# Patient Record
Sex: MALE | Race: WHITE | Employment: OTHER | URBAN - METROPOLITAN AREA
[De-identification: names, ages, dates, MRNs, and addresses within clinical notes are randomized per-mention and may not be internally consistent; named-entity substitution may affect disease eponyms.]

---

## 2017-11-22 PROBLEM — J34.89 NASAL OBSTRUCTION: Status: ACTIVE | Noted: 2017-06-27

## 2017-11-22 PROBLEM — J32.2 SINUSITIS CHRONIC, ETHMOIDAL: Status: ACTIVE | Noted: 2017-08-11

## 2017-11-22 PROBLEM — J33.9 NASAL POLYPS: Status: ACTIVE | Noted: 2017-08-11

## 2017-11-22 PROBLEM — R41.3 MEMORY LOSS: Status: ACTIVE | Noted: 2017-11-22

## 2017-11-29 ENCOUNTER — HOSPITAL ENCOUNTER (OUTPATIENT)
Dept: LAB | Age: 77
Discharge: HOME | End: 2017-11-29

## 2017-11-29 PROCEDURE — 88305 TISSUE EXAM BY PATHOLOGIST: CPT | Performed by: INTERNAL MEDICINE

## 2018-04-09 ENCOUNTER — HOSPITAL ENCOUNTER (EMERGENCY)
Age: 78
Discharge: HOME | End: 2018-04-09
Attending: EMERGENCY MEDICINE
Payer: MEDICARE

## 2018-04-09 VITALS
TEMPERATURE: 98.2 F | HEART RATE: 61 BPM | SYSTOLIC BLOOD PRESSURE: 150 MMHG | RESPIRATION RATE: 16 BRPM | DIASTOLIC BLOOD PRESSURE: 77 MMHG | BODY MASS INDEX: 27.84 KG/M2 | WEIGHT: 194 LBS | OXYGEN SATURATION: 98 %

## 2018-04-09 DIAGNOSIS — R33.9 URINARY RETENTION: Primary | ICD-10-CM

## 2018-04-09 LAB
APPEARANCE UR: CLEAR
BACTERIA URNS QL MICRO: 0 /HPF
BILIRUB UR QL: NEGATIVE
CASTS URNS QL MICRO: ABNORMAL /LPF
COLOR UR: YELLOW
EPI CELLS #/AREA URNS HPF: 0 /HPF
GLUCOSE UR STRIP.AUTO-MCNC: NEGATIVE MG/DL
HGB UR QL STRIP: ABNORMAL
KETONES UR QL STRIP.AUTO: NEGATIVE MG/DL
LEUKOCYTE ESTERASE UR QL STRIP.AUTO: NEGATIVE
NITRITE UR QL STRIP.AUTO: NEGATIVE
PH UR STRIP: 7 [PH] (ref 5–9)
PROT UR STRIP-MCNC: NEGATIVE MG/DL
RBC #/AREA URNS HPF: >100 /HPF
SP GR UR REFRACTOMETRY: 1.01 (ref 1–1.02)
UROBILINOGEN UR QL STRIP.AUTO: 0.2 EU/DL (ref 0.2–1)
WBC URNS QL MICRO: ABNORMAL /HPF

## 2018-04-09 PROCEDURE — 99284 EMERGENCY DEPT VISIT MOD MDM: CPT | Performed by: EMERGENCY MEDICINE

## 2018-04-09 PROCEDURE — 77030005518 HC CATH URETH FOL 2W BARD -B

## 2018-04-09 PROCEDURE — 81001 URINALYSIS AUTO W/SCOPE: CPT | Performed by: EMERGENCY MEDICINE

## 2018-04-09 PROCEDURE — 51702 INSERT TEMP BLADDER CATH: CPT | Performed by: EMERGENCY MEDICINE

## 2018-04-09 RX ORDER — TAMSULOSIN HYDROCHLORIDE 0.4 MG/1
0.4 CAPSULE ORAL DAILY
Qty: 10 CAP | Refills: 0 | Status: SHIPPED | OUTPATIENT
Start: 2018-04-09 | End: 2018-04-19

## 2018-04-09 NOTE — ED NOTES
I have reviewed discharge instructions with the patient and spouse. The patient and spouse verbalized understanding. Patient left ED via Discharge Method: ambulatory to Home with spouse    Opportunity for questions and clarification provided. Patient given 1 scripts. To continue your aftercare when you leave the hospital, you may receive an automated call from our care team to check in on how you are doing. This is a free service and part of our promise to provide the best care and service to meet your aftercare needs.  If you have questions, or wish to unsubscribe from this service please call [phone]. Thank you for Choosing our [facility] Emergency Department.

## 2018-04-09 NOTE — ED TRIAGE NOTES
Pt reports he was told he had a kidney stone. Pt states feeling like he is unable to pass very much urine.     Ganesh Yap RN

## 2018-04-09 NOTE — DISCHARGE INSTRUCTIONS

## 2018-04-09 NOTE — ED PROVIDER NOTES
HPI Comments: Patient presents to the ER complaining of urinary retention. Reports he recently  Passed a kidney stone. Had had some blood in his urine 3 days ago. Reports yesterday he had issues with increased urinary urgency but only has been able to get small amounts of urine out. Denies any fevers or vomiting. Reports increase in suprapubic pressure and pain. Patient is a 69 y.o. male presenting with inability to urinate. The history is provided by the patient. Urinary Retention    This is a new problem. The current episode started 12 to 24 hours ago. The problem occurs constantly. The problem has not changed since onset. The pain is located in the suprapubic region. The quality of the pain is throbbing. The pain is at a severity of 5/10. The pain is moderate. Associated symptoms include frequency. Pertinent negatives include no fever, no melena, no vomiting, no dysuria and no back pain. Past Medical History:   Diagnosis Date    Hypercholesterolemia     Hypertension        History reviewed. No pertinent surgical history. History reviewed. No pertinent family history. Social History     Social History    Marital status:      Spouse name: N/A    Number of children: N/A    Years of education: N/A     Occupational History    Not on file. Social History Main Topics    Smoking status: Never Smoker    Smokeless tobacco: Never Used    Alcohol use Yes      Comment: socially    Drug use: Not on file    Sexual activity: Not on file     Other Topics Concern    Not on file     Social History Narrative         ALLERGIES: Review of patient's allergies indicates no known allergies. Review of Systems   Constitutional: Positive for fatigue. Negative for fever. HENT: Negative for congestion and dental problem. Eyes: Negative for photophobia, redness and visual disturbance. Respiratory: Negative for chest tightness and stridor. Cardiovascular: Negative for palpitations. Gastrointestinal: Negative for abdominal pain, anal bleeding, melena and vomiting. Endocrine: Negative for polydipsia, polyphagia and polyuria. Genitourinary: Positive for frequency and urgency. Negative for dysuria. Musculoskeletal: Negative for back pain. Skin: Negative for pallor and rash. Allergic/Immunologic: Negative for food allergies and immunocompromised state. Neurological: Negative for light-headedness and numbness. Hematological: Negative for adenopathy. Does not bruise/bleed easily. Psychiatric/Behavioral: Negative for behavioral problems and confusion. All other systems reviewed and are negative. Vitals:    04/09/18 1207   BP: 122/65   Pulse: 61   Resp: 16   Temp: 98.2 °F (36.8 °C)   SpO2: 98%   Weight: 88 kg (194 lb)            Physical Exam   Constitutional: He is oriented to person, place, and time. He appears well-developed and well-nourished. HENT:   Head: Normocephalic and atraumatic. Mouth/Throat: Oropharynx is clear and moist.   Eyes: Conjunctivae and EOM are normal. Pupils are equal, round, and reactive to light. No scleral icterus. Cardiovascular: Normal rate, regular rhythm and intact distal pulses. Pulmonary/Chest: Effort normal and breath sounds normal. No respiratory distress. He has no wheezes. Abdominal: Soft. Bowel sounds are normal. There is tenderness in the suprapubic area. Musculoskeletal: Normal range of motion. He exhibits no edema or deformity. Neurological: He is alert and oriented to person, place, and time. He has normal reflexes. Nursing note and vitals reviewed. MDM  Number of Diagnoses or Management Options  Diagnosis management comments: Bedside ultrasound does reveal a distended bladder. We'll place Ballard catheter. Obtain urinalysis to rule out infection    2:27 PM  Normal urinalysis, Ballard did drain approximately 900 cc of clear yellow urine.   We will discharge with Ballard in place, encouraged close follow-up with urology       Amount and/or Complexity of Data Reviewed  Clinical lab tests: ordered and reviewed    Risk of Complications, Morbidity, and/or Mortality  Presenting problems: moderate  Diagnostic procedures: low  Management options: low          ED Course       Bedside US  Date/Time: 4/9/2018 1:46 PM  Performed by: Korin Jaramillo by: Tad Marmolejo     Written consent obtained: Yes    Given by:  Patient  Performed by: Attending  Type of procedure: Focused renal/urinary tract  Indications:  Urinary retention  Right kidney long axis (coronal): Not obtained  Right kidney short axis:  Not obtained  Left kidney long axis (coronal):   Not obtained  Left kidney short axis:  Not obtained  Transverse bladder:  Adequate  Sagittal bladder:  Adequate  Bladder volume (ml):  800  Bladder size:  Distended

## 2018-04-10 ENCOUNTER — HOSPITAL ENCOUNTER (EMERGENCY)
Age: 78
Discharge: HOME | End: 2018-04-10
Attending: EMERGENCY MEDICINE
Payer: MEDICARE

## 2018-04-10 PROCEDURE — 75810000275 HC EMERGENCY DEPT VISIT NO LEVEL OF CARE: Performed by: EMERGENCY MEDICINE

## 2018-04-13 PROBLEM — N20.0 RENAL STONE: Status: ACTIVE | Noted: 2018-04-13

## 2018-04-13 PROBLEM — R33.9 URINARY RETENTION: Status: ACTIVE | Noted: 2018-04-13

## 2018-04-16 ENCOUNTER — HOSPITAL ENCOUNTER (EMERGENCY)
Age: 78
Discharge: HOME | End: 2018-04-16
Attending: EMERGENCY MEDICINE
Payer: MEDICARE

## 2018-04-16 VITALS
DIASTOLIC BLOOD PRESSURE: 70 MMHG | TEMPERATURE: 97.5 F | SYSTOLIC BLOOD PRESSURE: 144 MMHG | RESPIRATION RATE: 16 BRPM | HEART RATE: 66 BPM | WEIGHT: 192 LBS | HEIGHT: 71 IN | OXYGEN SATURATION: 98 % | BODY MASS INDEX: 26.88 KG/M2

## 2018-04-16 DIAGNOSIS — R33.9 URINARY RETENTION: Primary | ICD-10-CM

## 2018-04-16 LAB
BACTERIA URNS QL MICRO: NORMAL /HPF
CASTS URNS QL MICRO: 0 /LPF
CRYSTALS URNS QL MICRO: 0 /LPF
EPI CELLS #/AREA URNS HPF: NORMAL /HPF
MUCOUS THREADS URNS QL MICRO: 0 /LPF
RBC #/AREA URNS HPF: NORMAL /HPF
WBC URNS QL MICRO: NORMAL /HPF

## 2018-04-16 PROCEDURE — 74011000250 HC RX REV CODE- 250: Performed by: EMERGENCY MEDICINE

## 2018-04-16 PROCEDURE — 99284 EMERGENCY DEPT VISIT MOD MDM: CPT | Performed by: EMERGENCY MEDICINE

## 2018-04-16 PROCEDURE — 77030005518 HC CATH URETH FOL 2W BARD -B

## 2018-04-16 PROCEDURE — 51702 INSERT TEMP BLADDER CATH: CPT | Performed by: EMERGENCY MEDICINE

## 2018-04-16 PROCEDURE — 81015 MICROSCOPIC EXAM OF URINE: CPT | Performed by: EMERGENCY MEDICINE

## 2018-04-16 RX ORDER — TAMSULOSIN HYDROCHLORIDE 0.4 MG/1
0.4 CAPSULE ORAL DAILY
Qty: 7 CAP | Refills: 0 | Status: SHIPPED | OUTPATIENT
Start: 2018-04-16 | End: 2018-04-23

## 2018-04-16 RX ORDER — LIDOCAINE HYDROCHLORIDE 20 MG/ML
JELLY TOPICAL
Status: COMPLETED | OUTPATIENT
Start: 2018-04-16 | End: 2018-04-16

## 2018-04-16 RX ADMIN — LIDOCAINE HYDROCHLORIDE: 20 JELLY TOPICAL at 21:03

## 2018-04-17 NOTE — DISCHARGE INSTRUCTIONS

## 2018-04-17 NOTE — ED TRIAGE NOTES
Pt had urinary catheter removed today. Cath had been in for a week. Pt said he hasn't urinated much since 3pm.  C/o bladder pain/pressure.

## 2018-04-17 NOTE — ED PROVIDER NOTES
HPI Comments: Patient is a 60-year-old male presenting with urinary retention. States about 2 weeks ago he passed a kidney stone. After that he required a Ballard catheter for retention. He had a catheter removed today states he did fine initially but developed significant difficulty urinating at approximately 4 PM.  States bladder pain and lower abdominal pressure became so severe that he came to the emergency department. Denies any recent fevers or further issues. Patient is a 69 y.o. male presenting with inability to urinate. The history is provided by the patient. No  was used. Urinary Retention    Pertinent negatives include no fever, no nausea, no vomiting, no headaches and no back pain. Past Medical History:   Diagnosis Date    Hypercholesterolemia     Hypertension     Kidney stone        History reviewed. No pertinent surgical history. History reviewed. No pertinent family history. Social History     Social History    Marital status:      Spouse name: N/A    Number of children: N/A    Years of education: N/A     Occupational History    Not on file. Social History Main Topics    Smoking status: Former Smoker     Years: 15.00    Smokeless tobacco: Never Used    Alcohol use Yes      Comment: socially    Drug use: Not on file    Sexual activity: Not on file     Other Topics Concern    Not on file     Social History Narrative         ALLERGIES: Review of patient's allergies indicates no known allergies. Review of Systems   Constitutional: Negative for fatigue and fever. HENT: Negative for congestion. Respiratory: Negative for cough and shortness of breath. Gastrointestinal: Positive for abdominal pain. Negative for nausea and vomiting. Genitourinary: Positive for difficulty urinating. Negative for flank pain. Musculoskeletal: Negative for back pain. Skin: Negative for rash. Neurological: Negative for headaches. Psychiatric/Behavioral: Negative for confusion. Vitals:    04/16/18 2056   BP: 146/67   Pulse: 62   Resp: 18   Temp: 97.5 °F (36.4 °C)   SpO2: 97%   Weight: 87.1 kg (192 lb)   Height: 5' 11\" (1.803 m)            Physical Exam   Constitutional: He is oriented to person, place, and time. He appears well-developed and well-nourished. No distress. HENT:   Head: Normocephalic and atraumatic. Eyes: Conjunctivae and EOM are normal. Pupils are equal, round, and reactive to light. Neck: Normal range of motion. Neck supple. Cardiovascular: Normal rate, regular rhythm and normal heart sounds. Pulmonary/Chest: Effort normal and breath sounds normal. No respiratory distress. He has no wheezes. He has no rales. Abdominal: Soft. He exhibits distension. There is tenderness. There is no rebound. Moderate distention and pain in the suprapubic region. No guarding. No rebound. Musculoskeletal: Normal range of motion. He exhibits no edema or tenderness. Neurological: He is alert and oriented to person, place, and time. Skin: Skin is warm and dry. No rash noted. He is not diaphoretic. Psychiatric: He has a normal mood and affect. His behavior is normal.   Vitals reviewed. MDM  Number of Diagnoses or Management Options  Urinary retention: new and does not require workup  Diagnosis management comments: Patient improved significantly with Ballard placement. Now denying pain. Unfortunately patient will go home with Ballard again a follow-up with urology. Refilled patient's Flomax. Return precautions discussed. Patient and wife express understanding. Discharged in stable condition      Flaco Chandler MD; 4/17/2018 @12:30 AM Voice dictation software was used during the making of this note. This software is not perfect and grammatical and other typographical errors may be present.   This note has not been proofread for errors.  =================================================================== Amount and/or Complexity of Data Reviewed  Review and summarize past medical records: yes  Independent visualization of images, tracings, or specimens: yes    Risk of Complications, Morbidity, and/or Mortality  Diagnostic procedures: low  Management options: low    Patient Progress  Patient progress: improved        ED Course       Procedures

## 2018-04-17 NOTE — ED NOTES
I have reviewed discharge instructions with the patient. The patient verbalized understanding. Patient left ED via Discharge Method: ambulatory to Home with spouse. Opportunity for questions and clarification provided. Patient given 1 scripts. To continue your aftercare when you leave the hospital, you may receive an automated call from our care team to check in on how you are doing. This is a free service and part of our promise to provide the best care and service to meet your aftercare needs.  If you have questions, or wish to unsubscribe from this service please call [phone]. Thank you for Choosing our [facility].

## 2020-10-28 PROBLEM — R33.9 URINARY RETENTION: Status: RESOLVED | Noted: 2018-04-13 | Resolved: 2020-10-28

## 2020-10-28 PROBLEM — R73.01 IMPAIRED FASTING BLOOD SUGAR: Status: ACTIVE | Noted: 2020-10-28

## 2021-11-03 ENCOUNTER — OFFICE VISIT (OUTPATIENT)
Dept: CARDIOLOGY | Facility: CLINIC | Age: 81
End: 2021-11-03
Payer: MEDICARE

## 2021-11-03 VITALS
HEART RATE: 58 BPM | BODY MASS INDEX: 27.75 KG/M2 | WEIGHT: 193.81 LBS | HEIGHT: 70 IN | DIASTOLIC BLOOD PRESSURE: 68 MMHG | SYSTOLIC BLOOD PRESSURE: 140 MMHG

## 2021-11-03 DIAGNOSIS — R93.1 ABNORMAL SCREENING CARDIAC CT: ICD-10-CM

## 2021-11-03 DIAGNOSIS — R07.89 ATYPICAL CHEST PAIN: ICD-10-CM

## 2021-11-03 DIAGNOSIS — E78.2 MIXED HYPERLIPIDEMIA: ICD-10-CM

## 2021-11-03 DIAGNOSIS — Z01.30 ENCOUNTER FOR EXAMINATION OF BLOOD PRESSURE WITHOUT ABNORMAL FINDINGS: ICD-10-CM

## 2021-11-03 DIAGNOSIS — I10 ESSENTIAL HYPERTENSION: Primary | ICD-10-CM

## 2021-11-03 DIAGNOSIS — I77.811 AORTIC ECTASIA, ABDOMINAL: ICD-10-CM

## 2021-11-03 PROCEDURE — 99999 PR PBB SHADOW E&M-EST. PATIENT-LVL III: ICD-10-PCS | Mod: PBBFAC,,, | Performed by: INTERNAL MEDICINE

## 2021-11-03 PROCEDURE — 99999 PR PBB SHADOW E&M-EST. PATIENT-LVL III: CPT | Mod: PBBFAC,,, | Performed by: INTERNAL MEDICINE

## 2021-11-03 PROCEDURE — 93010 EKG 12-LEAD: ICD-10-PCS | Mod: ,,, | Performed by: INTERNAL MEDICINE

## 2021-11-03 PROCEDURE — 93010 ELECTROCARDIOGRAM REPORT: CPT | Mod: ,,, | Performed by: INTERNAL MEDICINE

## 2021-11-03 PROCEDURE — 99204 PR OFFICE/OUTPT VISIT, NEW, LEVL IV, 45-59 MIN: ICD-10-PCS | Mod: S$PBB,,, | Performed by: INTERNAL MEDICINE

## 2021-11-03 PROCEDURE — 93005 ELECTROCARDIOGRAM TRACING: CPT | Mod: PO

## 2021-11-03 PROCEDURE — 99204 OFFICE O/P NEW MOD 45 MIN: CPT | Mod: S$PBB,,, | Performed by: INTERNAL MEDICINE

## 2021-11-03 PROCEDURE — 99213 OFFICE O/P EST LOW 20 MIN: CPT | Mod: PBBFAC,PO | Performed by: INTERNAL MEDICINE

## 2021-11-03 RX ORDER — ATORVASTATIN CALCIUM 40 MG/1
40 TABLET, FILM COATED ORAL NIGHTLY
Qty: 90 TABLET | Refills: 3 | Status: SHIPPED | OUTPATIENT
Start: 2021-11-03 | End: 2022-10-19 | Stop reason: SDUPTHER

## 2021-11-03 RX ORDER — LANOLIN ALCOHOL/MO/W.PET/CERES
100 CREAM (GRAM) TOPICAL DAILY
COMMUNITY

## 2021-11-03 RX ORDER — CHOLECALCIFEROL (VITAMIN D3) 25 MCG
1000 TABLET ORAL DAILY
COMMUNITY

## 2021-11-03 RX ORDER — PYRIDOXINE HCL (VITAMIN B6) 100 MG
50 TABLET ORAL DAILY
COMMUNITY

## 2021-11-16 ENCOUNTER — HOSPITAL ENCOUNTER (OUTPATIENT)
Dept: RADIOLOGY | Facility: HOSPITAL | Age: 81
Discharge: HOME | End: 2021-11-16
Attending: INTERNAL MEDICINE
Payer: MEDICARE

## 2021-11-16 ENCOUNTER — CLINICAL SUPPORT (OUTPATIENT)
Dept: CARDIOLOGY | Facility: HOSPITAL | Age: 81
End: 2021-11-16
Attending: INTERNAL MEDICINE
Payer: MEDICARE

## 2021-11-16 VITALS — BODY MASS INDEX: 27.63 KG/M2 | HEIGHT: 70 IN | WEIGHT: 193 LBS

## 2021-11-16 VITALS — WEIGHT: 193 LBS | HEIGHT: 70 IN | BODY MASS INDEX: 27.63 KG/M2

## 2021-11-16 DIAGNOSIS — Z01.30 ENCOUNTER FOR EXAMINATION OF BLOOD PRESSURE WITHOUT ABNORMAL FINDINGS: ICD-10-CM

## 2021-11-16 DIAGNOSIS — R07.89 ATYPICAL CHEST PAIN: ICD-10-CM

## 2021-11-16 PROCEDURE — 93018 PR CARDIAC STRESS TST,INTERP/REPT ONLY: ICD-10-PCS | Mod: ,,, | Performed by: INTERNAL MEDICINE

## 2021-11-16 PROCEDURE — 93306 ECHO (CUPID ONLY): ICD-10-PCS | Mod: 26,,, | Performed by: INTERNAL MEDICINE

## 2021-11-16 PROCEDURE — 93016 CV STRESS TEST SUPVJ ONLY: CPT | Mod: ,,, | Performed by: INTERNAL MEDICINE

## 2021-11-16 PROCEDURE — 93018 CV STRESS TEST I&R ONLY: CPT | Mod: ,,, | Performed by: INTERNAL MEDICINE

## 2021-11-16 PROCEDURE — 93016 STRESS TEST WITH MYOCARDIAL PERFUSION (CUPID ONLY): ICD-10-PCS | Mod: ,,, | Performed by: INTERNAL MEDICINE

## 2021-11-16 PROCEDURE — A9502 TC99M TETROFOSMIN: HCPCS | Mod: PO

## 2021-11-16 PROCEDURE — 93017 CV STRESS TEST TRACING ONLY: CPT | Mod: PO

## 2021-11-16 PROCEDURE — 78452 HT MUSCLE IMAGE SPECT MULT: CPT | Mod: 26,,, | Performed by: INTERNAL MEDICINE

## 2021-11-16 PROCEDURE — 63600175 PHARM REV CODE 636 W HCPCS: Mod: PO | Performed by: INTERNAL MEDICINE

## 2021-11-16 PROCEDURE — 93306 TTE W/DOPPLER COMPLETE: CPT | Mod: 26,,, | Performed by: INTERNAL MEDICINE

## 2021-11-16 PROCEDURE — 93306 TTE W/DOPPLER COMPLETE: CPT | Mod: PO

## 2021-11-16 PROCEDURE — 78452 STRESS TEST WITH MYOCARDIAL PERFUSION (CUPID ONLY): ICD-10-PCS | Mod: 26,,, | Performed by: INTERNAL MEDICINE

## 2021-11-16 PROCEDURE — 78452 HT MUSCLE IMAGE SPECT MULT: CPT | Mod: PO

## 2021-11-16 RX ORDER — REGADENOSON 0.08 MG/ML
0.4 INJECTION, SOLUTION INTRAVENOUS ONCE
Status: COMPLETED | OUTPATIENT
Start: 2021-11-16 | End: 2021-11-16

## 2021-11-16 RX ADMIN — REGADENOSON 0.4 MG: 0.08 INJECTION, SOLUTION INTRAVENOUS at 09:11

## 2021-11-17 LAB
ASCENDING AORTA: 3.08 CM
AV INDEX (PROSTH): 0.85
AV MEAN GRADIENT: 5 MMHG
AV PEAK GRADIENT: 8 MMHG
AV VALVE AREA: 3.04 CM2
AV VELOCITY RATIO: 0.89
BSA FOR ECHO PROCEDURE: 2.08 M2
CV ECHO LV RWT: 0.5 CM
CV PHARM DOSE: 0.4 MG
CV STRESS BASE HR: 53 BPM
DIASTOLIC BLOOD PRESSURE: 57 MMHG
DOP CALC AO PEAK VEL: 1.41 M/S
DOP CALC AO VTI: 34.49 CM
DOP CALC LVOT AREA: 3.6 CM2
DOP CALC LVOT DIAMETER: 2.13 CM
DOP CALC LVOT PEAK VEL: 1.26 M/S
DOP CALC LVOT STROKE VOLUME: 104.71 CM3
DOP CALCLVOT PEAK VEL VTI: 29.4 CM
E WAVE DECELERATION TIME: 164.47 MSEC
E/A RATIO: 0.8
E/E' RATIO: 10.27 M/S
ECHO LV POSTERIOR WALL: 1.01 CM (ref 0.6–1.1)
EJECTION FRACTION: 65 %
FRACTIONAL SHORTENING: 33 % (ref 28–44)
INTERVENTRICULAR SEPTUM: 0.97 CM (ref 0.6–1.1)
LA MAJOR: 5.61 CM
LA MINOR: 4.92 CM
LA WIDTH: 3.87 CM
LEFT ATRIUM SIZE: 3.98 CM
LEFT ATRIUM VOLUME INDEX: 33.3 ML/M2
LEFT ATRIUM VOLUME: 68.63 CM3
LEFT INTERNAL DIMENSION IN SYSTOLE: 2.7 CM (ref 2.1–4)
LEFT VENTRICLE DIASTOLIC VOLUME INDEX: 34.69 ML/M2
LEFT VENTRICLE DIASTOLIC VOLUME: 71.46 ML
LEFT VENTRICLE MASS INDEX: 62 G/M2
LEFT VENTRICLE SYSTOLIC VOLUME INDEX: 13.2 ML/M2
LEFT VENTRICLE SYSTOLIC VOLUME: 27.1 ML
LEFT VENTRICULAR INTERNAL DIMENSION IN DIASTOLE: 4.03 CM (ref 3.5–6)
LEFT VENTRICULAR MASS: 126.76 G
LV LATERAL E/E' RATIO: 8.56 M/S
LV SEPTAL E/E' RATIO: 12.83 M/S
MV A" WAVE DURATION": 9.71 MSEC
MV PEAK A VEL: 0.96 M/S
MV PEAK E VEL: 0.77 M/S
MV STENOSIS PRESSURE HALF TIME: 47.7 MS
MV VALVE AREA P 1/2 METHOD: 4.61 CM2
NUC REST EJECTION FRACTION: 66
OHS CV CPX 1 MINUTE RECOVERY HEART RATE: 82 BPM
OHS CV CPX 85 PERCENT MAX PREDICTED HEART RATE MALE: 119
OHS CV CPX MAX PREDICTED HEART RATE: 140
OHS CV CPX PATIENT IS FEMALE: 0
OHS CV CPX PATIENT IS MALE: 1
OHS CV CPX PEAK DIASTOLIC BLOOD PRESSURE: 47 MMHG
OHS CV CPX PEAK HEAR RATE: 84 BPM
OHS CV CPX PEAK RATE PRESSURE PRODUCT: NORMAL
OHS CV CPX PEAK SYSTOLIC BLOOD PRESSURE: 122 MMHG
OHS CV CPX PERCENT MAX PREDICTED HEART RATE ACHIEVED: 60
OHS CV CPX RATE PRESSURE PRODUCT PRESENTING: 6360
OHS CV PHARM TIME: 938 MIN
PISA TR MAX VEL: 2.42 M/S
PULM VEIN S/D RATIO: 1.62
PV PEAK D VEL: 0.29 M/S
PV PEAK S VEL: 0.47 M/S
RA MAJOR: 5.39 CM
RA PRESSURE: 3 MMHG
RA WIDTH: 3.16 CM
RIGHT VENTRICULAR END-DIASTOLIC DIMENSION: 3.28 CM
RV TISSUE DOPPLER FREE WALL SYSTOLIC VELOCITY 1 (APICAL 4 CHAMBER VIEW): 13.65 CM/S
SINUS: 3.02 CM
STJ: 3.09 CM
SYSTOLIC BLOOD PRESSURE: 120 MMHG
TDI LATERAL: 0.09 M/S
TDI SEPTAL: 0.06 M/S
TDI: 0.08 M/S
TR MAX PG: 23 MMHG
TRICUSPID ANNULAR PLANE SYSTOLIC EXCURSION: 2.7 CM
TV REST PULMONARY ARTERY PRESSURE: 26 MMHG

## 2021-12-06 ENCOUNTER — OFFICE VISIT (OUTPATIENT)
Dept: CARDIOLOGY | Facility: CLINIC | Age: 81
End: 2021-12-06
Payer: MEDICARE

## 2021-12-06 VITALS
DIASTOLIC BLOOD PRESSURE: 73 MMHG | HEIGHT: 70 IN | WEIGHT: 194.69 LBS | SYSTOLIC BLOOD PRESSURE: 145 MMHG | HEART RATE: 55 BPM | OXYGEN SATURATION: 98 % | BODY MASS INDEX: 27.87 KG/M2

## 2021-12-06 DIAGNOSIS — I77.811 AORTIC ECTASIA, ABDOMINAL: ICD-10-CM

## 2021-12-06 DIAGNOSIS — E78.2 MIXED HYPERLIPIDEMIA: ICD-10-CM

## 2021-12-06 DIAGNOSIS — R93.1 ABNORMAL SCREENING CARDIAC CT: ICD-10-CM

## 2021-12-06 DIAGNOSIS — R94.39 POSITIVE CARDIAC STRESS TEST: Primary | ICD-10-CM

## 2021-12-06 DIAGNOSIS — I10 ESSENTIAL HYPERTENSION: ICD-10-CM

## 2021-12-06 PROCEDURE — 99999 PR PBB SHADOW E&M-EST. PATIENT-LVL IV: ICD-10-PCS | Mod: PBBFAC,,, | Performed by: INTERNAL MEDICINE

## 2021-12-06 PROCEDURE — 99215 PR OFFICE/OUTPT VISIT, EST, LEVL V, 40-54 MIN: ICD-10-PCS | Mod: S$PBB,,, | Performed by: INTERNAL MEDICINE

## 2021-12-06 PROCEDURE — 99999 PR PBB SHADOW E&M-EST. PATIENT-LVL IV: CPT | Mod: PBBFAC,,, | Performed by: INTERNAL MEDICINE

## 2021-12-06 PROCEDURE — 99215 OFFICE O/P EST HI 40 MIN: CPT | Mod: S$PBB,,, | Performed by: INTERNAL MEDICINE

## 2021-12-06 PROCEDURE — 99214 OFFICE O/P EST MOD 30 MIN: CPT | Mod: PBBFAC,PO | Performed by: INTERNAL MEDICINE

## 2021-12-06 RX ORDER — ISOSORBIDE MONONITRATE 30 MG/1
30 TABLET, EXTENDED RELEASE ORAL DAILY
Qty: 30 TABLET | Refills: 11 | Status: SHIPPED | OUTPATIENT
Start: 2021-12-06 | End: 2021-12-07 | Stop reason: SDUPTHER

## 2021-12-06 RX ORDER — ZINC GLUCONATE 50 MG
50 TABLET ORAL DAILY
COMMUNITY
End: 2022-06-24 | Stop reason: CLARIF

## 2021-12-06 RX ORDER — ISOSORBIDE MONONITRATE 30 MG/1
30 TABLET, EXTENDED RELEASE ORAL DAILY
Qty: 30 TABLET | Refills: 0 | OUTPATIENT
Start: 2021-12-06 | End: 2022-12-06

## 2021-12-07 DIAGNOSIS — I10 ESSENTIAL HYPERTENSION: Primary | ICD-10-CM

## 2021-12-07 RX ORDER — ISOSORBIDE MONONITRATE 30 MG/1
30 TABLET, EXTENDED RELEASE ORAL DAILY
Qty: 30 TABLET | Refills: 0 | Status: SHIPPED | OUTPATIENT
Start: 2021-12-07 | End: 2022-02-14 | Stop reason: SDUPTHER

## 2021-12-07 RX ORDER — ISOSORBIDE MONONITRATE 30 MG/1
30 TABLET, EXTENDED RELEASE ORAL DAILY
Qty: 90 TABLET | Refills: 3 | Status: SHIPPED | OUTPATIENT
Start: 2021-12-07 | End: 2022-02-14

## 2021-12-16 ENCOUNTER — PATIENT MESSAGE (OUTPATIENT)
Dept: CARDIOLOGY | Facility: CLINIC | Age: 81
End: 2021-12-16
Payer: MEDICARE

## 2022-05-04 ENCOUNTER — PATIENT MESSAGE (OUTPATIENT)
Dept: CARDIOLOGY | Facility: CLINIC | Age: 82
End: 2022-05-04
Payer: MEDICARE

## 2022-05-04 NOTE — PROGRESS NOTES
"Subjective:    Patient ID:  Andres Germain Jr. is a 81 y.o. male who presents for follow-up of No chief complaint on file.      Problem List Items Addressed This Visit        Cardiac/Vascular    Elevated Cardiac CT Calcium Score    Overview     August 2021  1154           Essential hypertension - Primary    Family H/O Early CAD    Mild Abdominal Aortic Ectasia    Mixed hyperlipidemia          HPI    Patient was last seen on 12/06/2021 at which time patient was evaluated for positive stress test.  Left heart catheterization with coronary angiography was recommended, but patient declined secondary to bad experiences with angiograms in his family in the past.  Imdur was started.    On assessment today, the patient states that he feels OK today    No chest pain.  No shortness of breath.  Working in the yard for 2-3 hours without major issues    Symptoms better on Imdur - Possibly    Review of Systems   Constitutional: Negative for decreased appetite, fever and malaise/fatigue.   Eyes: Negative for blurred vision.   Cardiovascular: Negative for chest pain, dyspnea on exertion, irregular heartbeat and leg swelling.   Respiratory: Negative for cough, hemoptysis, shortness of breath and wheezing.    Endocrine: Negative for cold intolerance and heat intolerance.   Hematologic/Lymphatic: Negative for bleeding problem.   Musculoskeletal: Negative for muscle weakness and myalgias.   Gastrointestinal: Negative for abdominal pain, constipation and diarrhea.   Genitourinary: Negative for bladder incontinence.   Neurological: Negative for dizziness and weakness.   Psychiatric/Behavioral: Negative for depression.        Objective:     Vitals:    05/05/22 1137   BP: (!) 102/57   BP Location: Left arm   Patient Position: Sitting   BP Method: Large (Automatic)   Pulse: 61   Weight: 87.5 kg (192 lb 14.4 oz)   Height: 5' 10" (1.778 m)        Physical Exam  Constitutional:       Appearance: He is well-developed.   HENT:      Head: " Normocephalic and atraumatic.   Neck:      Vascular: No JVD.   Cardiovascular:      Rate and Rhythm: Normal rate and regular rhythm.      Heart sounds: Normal heart sounds. No murmur heard.    No friction rub. No gallop.   Pulmonary:      Effort: Pulmonary effort is normal. No respiratory distress.      Breath sounds: Normal breath sounds. No wheezing or rales.   Abdominal:      General: Bowel sounds are normal.      Palpations: Abdomen is soft.      Tenderness: There is no abdominal tenderness. There is no guarding or rebound.   Musculoskeletal:      Cervical back: Normal range of motion and neck supple.   Skin:     General: Skin is warm and dry.   Neurological:      Mental Status: He is alert and oriented to person, place, and time.   Psychiatric:         Behavior: Behavior normal.             Current Outpatient Medications on File Prior to Visit   Medication Sig    aspirin (ECOTRIN) 81 MG EC tablet Take 1 tablet (81 mg total) by mouth once daily.    atorvastatin (LIPITOR) 40 MG tablet Take 1 tablet (40 mg total) by mouth every evening.    cyanocobalamin (VITAMIN B-12) 1000 MCG tablet Take 100 mcg by mouth once daily.    finasteride (PROSCAR) 5 mg tablet Take 1 tablet (5 mg total) by mouth once daily.    flaxseed oil 1,000 mg Cap Take by mouth.    isosorbide mononitrate (IMDUR) 30 MG 24 hr tablet Take 1 tablet (30 mg total) by mouth once daily.    losartan-hydrochlorothiazide 100-25 mg (HYZAAR) 100-25 mg per tablet Take 1 tablet by mouth every morning.    montelukast (SINGULAIR) 10 mg tablet Take 1 tablet (10 mg total) by mouth every evening.    multivitamin with minerals tablet Take 1 tablet by mouth once daily.    omega-3 fatty acids/fish oil (FISH OIL-OMEGA-3 FATTY ACIDS) 300-1,000 mg capsule Take 1 capsule by mouth once daily.    pyridoxine, vitamin B6, (B-6) 100 MG Tab Take 50 mg by mouth once daily.    tamsulosin (FLOMAX) 0.4 mg Cap Take 1 capsule (0.4 mg total) by mouth once daily.    vitamin D  (VITAMIN D3) 1000 units Tab Take 1,000 Units by mouth once daily.    zinc gluconate 50 mg tablet Take 50 mg by mouth once daily.     No current facility-administered medications on file prior to visit.       Lipid Panel:   Lab Results   Component Value Date    CHOL 172 10/14/2021    HDL 54 10/14/2021    LDLCALC 98.0 10/14/2021    TRIG 100 10/14/2021    CHOLHDL 31.4 10/14/2021       The ASCVD Risk score (Joseinderjit YE Jr., et al., 2013) failed to calculate for the following reasons:    The 2013 ASCVD risk score is only valid for ages 40 to 79    All pertinent labs, imaging, and EKGs reviewed.  Patient's most recent EKG tracing was personally interpreted by this provider.    Assessment:       1. Essential hypertension    2. Mixed hyperlipidemia    3. Family H/O Early CAD    4. Elevated Cardiac CT Calcium Score    5. Mild Abdominal Aortic Ectasia         Plan:     Symptoms of occasional fatigue with low BP when dehydrated  BP/Pulse OK today  Most recent echocardiogram reviewed personally     Recommend hydration especially when working outside  Cardiac PET Stress secondary to nuclear stress concerns to help target possible angiogram  Continue Imdur 30 mg PO Daily  Repeat AAA study and 1.5 years  Continue aspirin 81 mg PO Daily  Continue atorvastatin 40 mg PO Daily  Discussed [facility] with patient, not interested at this time, understands risk    Continue other cardiac medications  Mediterranean Diet/Cardiovascular Exercise Program    Patient queried and all questions were answered.    F/u in 4-6 months to reassess      Signed:    Gigi Johnson MD  5/5/2022 1:28 PM

## 2022-05-05 ENCOUNTER — OFFICE VISIT (OUTPATIENT)
Dept: CARDIOLOGY | Facility: CLINIC | Age: 82
End: 2022-05-05
Payer: MEDICARE

## 2022-05-05 VITALS
HEART RATE: 61 BPM | SYSTOLIC BLOOD PRESSURE: 102 MMHG | DIASTOLIC BLOOD PRESSURE: 57 MMHG | WEIGHT: 192.88 LBS | BODY MASS INDEX: 27.61 KG/M2 | HEIGHT: 70 IN

## 2022-05-05 DIAGNOSIS — I10 ESSENTIAL HYPERTENSION: Primary | ICD-10-CM

## 2022-05-05 DIAGNOSIS — I77.811 AORTIC ECTASIA, ABDOMINAL: ICD-10-CM

## 2022-05-05 DIAGNOSIS — R93.1 ABNORMAL FINDINGS ON DIAGNOSTIC IMAGING OF HEART AND CORONARY CIRCULATION: ICD-10-CM

## 2022-05-05 DIAGNOSIS — Z82.49 FAMILY HISTORY OF EARLY CAD: ICD-10-CM

## 2022-05-05 DIAGNOSIS — E78.2 MIXED HYPERLIPIDEMIA: ICD-10-CM

## 2022-05-05 DIAGNOSIS — R93.1 ABNORMAL SCREENING CARDIAC CT: ICD-10-CM

## 2022-05-05 DIAGNOSIS — R94.39 POSITIVE CARDIAC STRESS TEST: ICD-10-CM

## 2022-05-05 PROCEDURE — 99214 OFFICE O/P EST MOD 30 MIN: CPT | Mod: S$PBB,,, | Performed by: INTERNAL MEDICINE

## 2022-05-05 PROCEDURE — 99213 OFFICE O/P EST LOW 20 MIN: CPT | Mod: PBBFAC,PO | Performed by: INTERNAL MEDICINE

## 2022-05-05 PROCEDURE — 99999 PR PBB SHADOW E&M-EST. PATIENT-LVL III: CPT | Mod: PBBFAC,,, | Performed by: INTERNAL MEDICINE

## 2022-05-05 PROCEDURE — 99999 PR PBB SHADOW E&M-EST. PATIENT-LVL III: ICD-10-PCS | Mod: PBBFAC,,, | Performed by: INTERNAL MEDICINE

## 2022-05-05 PROCEDURE — 99214 PR OFFICE/OUTPT VISIT, EST, LEVL IV, 30-39 MIN: ICD-10-PCS | Mod: S$PBB,,, | Performed by: INTERNAL MEDICINE

## 2022-05-20 ENCOUNTER — TELEPHONE (OUTPATIENT)
Dept: CARDIOLOGY | Facility: HOSPITAL | Age: 82
End: 2022-05-20
Payer: MEDICARE

## 2022-05-24 ENCOUNTER — HOSPITAL ENCOUNTER (OUTPATIENT)
Dept: CARDIOLOGY | Facility: HOSPITAL | Age: 82
Discharge: HOME | End: 2022-05-24
Attending: INTERNAL MEDICINE
Payer: MEDICARE

## 2022-05-24 VITALS
HEIGHT: 70 IN | RESPIRATION RATE: 20 BRPM | DIASTOLIC BLOOD PRESSURE: 65 MMHG | HEART RATE: 63 BPM | WEIGHT: 192 LBS | BODY MASS INDEX: 27.49 KG/M2 | SYSTOLIC BLOOD PRESSURE: 135 MMHG

## 2022-05-24 DIAGNOSIS — R94.39 POSITIVE CARDIAC STRESS TEST: ICD-10-CM

## 2022-05-24 DIAGNOSIS — R93.1 ABNORMAL FINDINGS ON DIAGNOSTIC IMAGING OF HEART AND CORONARY CIRCULATION: ICD-10-CM

## 2022-05-24 LAB
CFR FLOW - ANTERIOR: 1.92
CFR FLOW - INFERIOR: 1.89
CFR FLOW - LATERAL: 1.7
CFR FLOW - MAX: 2.29
CFR FLOW - MIN: 0.99
CFR FLOW - SEPTAL: 2.04
CFR FLOW - WHOLE HEART: 1.89
CV STRESS BASE HR: 54 BPM
DIASTOLIC BLOOD PRESSURE: 64 MMHG
EJECTION FRACTION- HIGH: 65 %
END DIASTOLIC INDEX-HIGH: 153 ML/M2
END DIASTOLIC INDEX-LOW: 93 ML/M2
END SYSTOLIC INDEX-HIGH: 71 ML/M2
END SYSTOLIC INDEX-LOW: 31 ML/M2
NUC REST DIASTOLIC VOLUME INDEX: 82
NUC REST EJECTION FRACTION: 71
NUC REST SYSTOLIC VOLUME INDEX: 24
NUC STRESS DIASTOLIC VOLUME INDEX: 70
NUC STRESS EJECTION FRACTION: 51 %
NUC STRESS SYSTOLIC VOLUME INDEX: 34
OHS CV CPX 1 MINUTE RECOVERY HEART RATE: 81 BPM
OHS CV CPX 85 PERCENT MAX PREDICTED HEART RATE MALE: 119
OHS CV CPX MAX PREDICTED HEART RATE: 140
OHS CV CPX PATIENT IS FEMALE: 0
OHS CV CPX PATIENT IS MALE: 1
OHS CV CPX PEAK DIASTOLIC BLOOD PRESSURE: 38 MMHG
OHS CV CPX PEAK HEAR RATE: 56 BPM
OHS CV CPX PEAK RATE PRESSURE PRODUCT: 6384
OHS CV CPX PEAK SYSTOLIC BLOOD PRESSURE: 114 MMHG
OHS CV CPX PERCENT MAX PREDICTED HEART RATE ACHIEVED: 40
OHS CV CPX RATE PRESSURE PRODUCT PRESENTING: 8856
PERFUSION DEFECT 1 SIZE IN %: 2 %
PERFUSION DEFECT SIZE WORSENS % 1: 10 %
REST FLOW - ANTERIOR: 0.52 CC/MIN/G
REST FLOW - INFERIOR: 0.56 CC/MIN/G
REST FLOW - LATERAL: 0.45 CC/MIN/G
REST FLOW - MAX: 0.74 CC/MIN/G
REST FLOW - MIN: 0.29 CC/MIN/G
REST FLOW - SEPTAL: 0.53 CC/MIN/G
REST FLOW - WHOLE HEART: 0.52 CC/MIN/G
RETIRED EF AND QEF - SEE NOTES: 53 %
STRESS FLOW - ANTERIOR: 0.99 CC/MIN/G
STRESS FLOW - INFERIOR: 1.07 CC/MIN/G
STRESS FLOW - LATERAL: 0.77 CC/MIN/G
STRESS FLOW - MAX: 1.67 CC/MIN/G
STRESS FLOW - MIN: 0.32 CC/MIN/G
STRESS FLOW - SEPTAL: 1.09 CC/MIN/G
STRESS FLOW - WHOLE HEART: 0.98 CC/MIN/G
SYSTOLIC BLOOD PRESSURE: 164 MMHG

## 2022-05-24 PROCEDURE — 78434 PR PET, ABS QUANT MYOCARD BLOOD FLOW, REST/STRESS: ICD-10-PCS | Mod: 26,,, | Performed by: INTERNAL MEDICINE

## 2022-05-24 PROCEDURE — 78431 MYOCRD IMG PET RST&STRS CT: CPT

## 2022-05-24 PROCEDURE — 93016 CV STRESS TEST SUPVJ ONLY: CPT | Mod: ,,, | Performed by: INTERNAL MEDICINE

## 2022-05-24 PROCEDURE — 78434 AQMBF PET REST & RX STRESS: CPT | Mod: 26,,, | Performed by: INTERNAL MEDICINE

## 2022-05-24 PROCEDURE — 78434 AQMBF PET REST & RX STRESS: CPT

## 2022-05-24 PROCEDURE — 78431 CARDIAC PET SCAN STRESS (CUPID ONLY): ICD-10-PCS | Mod: 26,,, | Performed by: INTERNAL MEDICINE

## 2022-05-24 PROCEDURE — 93016 CARDIAC PET SCAN STRESS (CUPID ONLY): ICD-10-PCS | Mod: ,,, | Performed by: INTERNAL MEDICINE

## 2022-05-24 PROCEDURE — 63600175 PHARM REV CODE 636 W HCPCS: Performed by: INTERNAL MEDICINE

## 2022-05-24 PROCEDURE — 78431 MYOCRD IMG PET RST&STRS CT: CPT | Mod: 26,,, | Performed by: INTERNAL MEDICINE

## 2022-05-24 PROCEDURE — 93018 CV STRESS TEST I&R ONLY: CPT | Mod: ,,, | Performed by: INTERNAL MEDICINE

## 2022-05-24 PROCEDURE — 93018 CARDIAC PET SCAN STRESS (CUPID ONLY): ICD-10-PCS | Mod: ,,, | Performed by: INTERNAL MEDICINE

## 2022-05-24 RX ORDER — REGADENOSON 0.08 MG/ML
0.4 INJECTION, SOLUTION INTRAVENOUS ONCE
Status: COMPLETED | OUTPATIENT
Start: 2022-05-24 | End: 2022-05-24

## 2022-05-24 RX ORDER — AMINOPHYLLINE 25 MG/ML
75 INJECTION, SOLUTION INTRAVENOUS ONCE
Status: COMPLETED | OUTPATIENT
Start: 2022-05-24 | End: 2022-05-24

## 2022-05-24 RX ADMIN — REGADENOSON 0.4 MG: 0.08 INJECTION, SOLUTION INTRAVENOUS at 11:05

## 2022-05-24 RX ADMIN — AMINOPHYLLINE 75 MG: 25 INJECTION, SOLUTION INTRAVENOUS at 11:05

## 2022-05-26 ENCOUNTER — TELEPHONE (OUTPATIENT)
Dept: CARDIOLOGY | Facility: CLINIC | Age: 82
End: 2022-05-26
Payer: MEDICARE

## 2022-05-26 NOTE — TELEPHONE ENCOUNTER
----- Message from Ashlee Harris sent at 5/26/2022  2:12 PM CDT -----  Contact: patient  Type:  Patient Returning Call    Who Called:  patient  Who Left Message for Patient:  Roberta  Does the patient know what this is regarding?:  appt  Best Call Back Number:  [phone] (home)   Additional Information:  patient has another question he forgot to ask.

## 2022-05-26 NOTE — TELEPHONE ENCOUNTER
----- Message from Olinda Stanford sent at 5/26/2022  1:47 PM CDT -----  Patient Call Back    Who Called: PT     What is the request in detail: pt returning a missed call regarding his results. Pls advise.    Can the clinic reply by [facility]?    Best Call Back Number: [phone] (H)

## 2022-05-30 ENCOUNTER — OFFICE VISIT (OUTPATIENT)
Dept: CARDIOLOGY | Facility: CLINIC | Age: 82
End: 2022-05-30
Payer: MEDICARE

## 2022-05-30 VITALS
BODY MASS INDEX: 27.87 KG/M2 | HEART RATE: 60 BPM | HEIGHT: 70 IN | WEIGHT: 194.69 LBS | DIASTOLIC BLOOD PRESSURE: 57 MMHG | SYSTOLIC BLOOD PRESSURE: 100 MMHG

## 2022-05-30 DIAGNOSIS — R93.1 ABNORMAL SCREENING CARDIAC CT: ICD-10-CM

## 2022-05-30 DIAGNOSIS — I10 ESSENTIAL HYPERTENSION: Primary | ICD-10-CM

## 2022-05-30 DIAGNOSIS — I77.811 AORTIC ECTASIA, ABDOMINAL: ICD-10-CM

## 2022-05-30 DIAGNOSIS — Z82.49 FAMILY HISTORY OF EARLY CAD: ICD-10-CM

## 2022-05-30 DIAGNOSIS — R94.39 POSITIVE CARDIAC STRESS TEST: Primary | ICD-10-CM

## 2022-05-30 DIAGNOSIS — E78.2 MIXED HYPERLIPIDEMIA: ICD-10-CM

## 2022-05-30 PROCEDURE — 99212 OFFICE O/P EST SF 10 MIN: CPT | Mod: PBBFAC,PO | Performed by: INTERNAL MEDICINE

## 2022-05-30 PROCEDURE — 99215 PR OFFICE/OUTPT VISIT, EST, LEVL V, 40-54 MIN: ICD-10-PCS | Mod: S$PBB,,, | Performed by: INTERNAL MEDICINE

## 2022-05-30 PROCEDURE — 99215 OFFICE O/P EST HI 40 MIN: CPT | Mod: S$PBB,,, | Performed by: INTERNAL MEDICINE

## 2022-05-30 PROCEDURE — 99999 PR PBB SHADOW E&M-EST. PATIENT-LVL II: CPT | Mod: PBBFAC,,, | Performed by: INTERNAL MEDICINE

## 2022-05-30 PROCEDURE — 99999 PR PBB SHADOW E&M-EST. PATIENT-LVL II: ICD-10-PCS | Mod: PBBFAC,,, | Performed by: INTERNAL MEDICINE

## 2022-05-30 RX ORDER — SODIUM CHLORIDE 9 MG/ML
INJECTION, SOLUTION INTRAVENOUS ONCE
Status: CANCELLED | OUTPATIENT
Start: 2022-05-30 | End: 2022-05-30

## 2022-05-30 RX ORDER — LOSARTAN POTASSIUM AND HYDROCHLOROTHIAZIDE 12.5; 1 MG/1; MG/1
1 TABLET ORAL DAILY
Qty: 90 TABLET | Refills: 3 | Status: SHIPPED | OUTPATIENT
Start: 2022-05-30 | End: 2023-03-02 | Stop reason: SDUPTHER

## 2022-05-30 RX ORDER — SODIUM CHLORIDE 0.9 % (FLUSH) 0.9 %
10 SYRINGE (ML) INJECTION
Status: SHIPPED | OUTPATIENT
Start: 2022-05-30

## 2022-05-30 NOTE — PATIENT INSTRUCTIONS
Angiogram    Arrive for procedure at: [facility] on 6/28/22. Your procedure is a 9 am with Dr Dennys Kerr.    You will receive a phone call from [facility] Pre-Op Department with further instructions prior to your scheduled procedure.    Notify the nurse if you are ALLERGIC TO IODINE.    FASTING: You MAY NOT have anything to eat or drink AFTER MIDNIGHT the day before your procedure. If your procedure is scheduled in the afternoon, you may have a LIGHT BREAKFAST 6-8 hours prior to your procedure.  For example: Two slices of toast; black coffee or black tea.    MEDICATIONS: You may take your regular morning medications with water. If there are any medications that you should not take, you will be instructed to hold them for that morning.      WHAT TO EXPECT:    How long will the procedure take?  The procedure will take an average of 1 - 2 hours to perform.  After the procedure, you will need to lay flat for around 4 - 6 hours to minimize bleeding from the puncture site. If the wrist is accessed you will need to keep your arm still as instructed by the nurse.    When can I go home?  You may be able to be discharged home that same afternoon if there were no complications.  If you have one of the following: balloon; stent; pacemaker or defibrillator procedures, you may spend one night for observation.  Your doctor will determine your discharge based upon your progress.  The results of your procedure will be discussed with you before you are discharged.  Any further testing or procedures will be scheduled for you either before you leave or you will be instructed to call for a future appointment.      TRANSPORTATION:  PLEASE ARRANGE TO HAVE SOMEONE DRIVE YOU HOME FOLLOWING YOUR PROCEDURE, YOU WILL NOT BE ALLOWED TO DRIVE.

## 2022-05-30 NOTE — PROGRESS NOTES
"Subjective:    Patient ID:  Andres Germain Jr. is a 81 y.o. male who presents for follow-up of No chief complaint on file.      Problem List Items Addressed This Visit        Cardiac/Vascular    Elevated Cardiac CT Calcium Score    Overview     August 2021  1154           Essential hypertension - Primary    Family H/O Early CAD    Mild Abdominal Aortic Ectasia    Mixed hyperlipidemia          HPI    Patient was last seen on 05/05/2022 at which time there was concern for a positive stress test, but patient was hesitant to move forward with angiogram, so cardiac PET stress was ordered to see if a lesion could be targeted.  Cardiac PET stress showed significant ischemia with underlying scar in the distribution of the left circumflex artery    On assessment today, the patient states that he feels OK.    No chest pain.  No shortness of breath.       Objective:     Vitals:    05/30/22 1332   BP: (!) 100/57   BP Location: Left arm   Patient Position: Sitting   BP Method: Large (Automatic)   Pulse: 60   Weight: 88.3 kg (194 lb 10.7 oz)   Height: 5' 10" (1.778 m)        Physical Exam  Constitutional:       Appearance: He is well-developed.   HENT:      Head: Normocephalic and atraumatic.   Neck:      Vascular: No JVD.   Cardiovascular:      Rate and Rhythm: Normal rate and regular rhythm.      Heart sounds: Normal heart sounds. No murmur heard.    No friction rub. No gallop.   Pulmonary:      Effort: Pulmonary effort is normal. No respiratory distress.      Breath sounds: Normal breath sounds. No wheezing or rales.   Abdominal:      General: Bowel sounds are normal.      Palpations: Abdomen is soft.      Tenderness: There is no abdominal tenderness. There is no guarding or rebound.   Musculoskeletal:      Cervical back: Normal range of motion and neck supple.   Skin:     General: Skin is warm and dry.   Neurological:      Mental Status: He is alert and oriented to person, place, and time.   Psychiatric:         Behavior: " Behavior normal.             Current Outpatient Medications on File Prior to Visit   Medication Sig    aspirin (ECOTRIN) 81 MG EC tablet Take 1 tablet (81 mg total) by mouth once daily.    atorvastatin (LIPITOR) 40 MG tablet Take 1 tablet (40 mg total) by mouth every evening.    cyanocobalamin (VITAMIN B-12) 1000 MCG tablet Take 100 mcg by mouth once daily.    finasteride (PROSCAR) 5 mg tablet Take 1 tablet (5 mg total) by mouth once daily.    flaxseed oil 1,000 mg Cap Take by mouth.    isosorbide mononitrate (IMDUR) 30 MG 24 hr tablet Take 1 tablet (30 mg total) by mouth once daily.    losartan-hydrochlorothiazide 100-25 mg (HYZAAR) 100-25 mg per tablet Take 1 tablet by mouth every morning.    montelukast (SINGULAIR) 10 mg tablet Take 1 tablet (10 mg total) by mouth every evening.    multivitamin with minerals tablet Take 1 tablet by mouth once daily.    omega-3 fatty acids/fish oil (FISH OIL-OMEGA-3 FATTY ACIDS) 300-1,000 mg capsule Take 1 capsule by mouth once daily.    pyridoxine, vitamin B6, (B-6) 100 MG Tab Take 50 mg by mouth once daily.    tamsulosin (FLOMAX) 0.4 mg Cap Take 1 capsule (0.4 mg total) by mouth once daily.    vitamin D (VITAMIN D3) 1000 units Tab Take 1,000 Units by mouth once daily.    zinc gluconate 50 mg tablet Take 50 mg by mouth once daily.     No current facility-administered medications on file prior to visit.       Lipid Panel:   Lab Results   Component Value Date    CHOL 172 10/14/2021    HDL 54 10/14/2021    LDLCALC 98.0 10/14/2021    TRIG 100 10/14/2021    CHOLHDL 31.4 10/14/2021       The ASCVD Risk score ([place] DC Jr., et al., 2013) failed to calculate for the following reasons:    The 2013 ASCVD risk score is only valid for ages 40 to 79    All pertinent labs, imaging, and EKGs reviewed.  Patient's most recent EKG tracing was personally interpreted by this provider.    Assessment:       1. Essential hypertension    2. Mixed hyperlipidemia    3. Family H/O Early CAD     4. Mild Abdominal Aortic Ectasia    5. Elevated Cardiac CT Calcium Score         Plan:     BP/Pulse OK today  Most recent cardiac PET stress test reviewed personally     Schedule left heart catheterization coronary angiography considering cardiac PET stress test that shows ischemia in the distribution of the left circumflex artery   This procedure has been fully reviewed with the patient   Continue Imdur 30 mg PO Daily  Repeat AAA study in 1.5 years   Continue aspirin 81 mg PO Daily  Continue atorvastatin 40 mg PO Daily  Reduce Hyzaar to 100-12.5mg PO Daily  Home BP log, call in 2 weeks with numbers    Continue other cardiac medications  Mediterranean Diet/Cardiovascular Exercise Program    Patient queried and all questions were answered.    F/u in 4 months to reassess      Signed:    Gigi Johnson MD  5/30/2022 8:10 PM

## 2022-07-27 ENCOUNTER — OFFICE VISIT (OUTPATIENT)
Dept: CARDIOLOGY | Facility: CLINIC | Age: 82
End: 2022-07-27
Payer: MEDICARE

## 2022-07-27 VITALS
BODY MASS INDEX: 27.36 KG/M2 | HEIGHT: 70 IN | SYSTOLIC BLOOD PRESSURE: 100 MMHG | HEART RATE: 66 BPM | DIASTOLIC BLOOD PRESSURE: 50 MMHG | WEIGHT: 191.13 LBS

## 2022-07-27 DIAGNOSIS — E78.2 MIXED HYPERLIPIDEMIA: ICD-10-CM

## 2022-07-27 DIAGNOSIS — I10 ESSENTIAL HYPERTENSION: Primary | ICD-10-CM

## 2022-07-27 DIAGNOSIS — Z82.49 FAMILY HISTORY OF EARLY CAD: ICD-10-CM

## 2022-07-27 DIAGNOSIS — I25.10 CORONARY ARTERY DISEASE INVOLVING NATIVE CORONARY ARTERY OF NATIVE HEART WITHOUT ANGINA PECTORIS: ICD-10-CM

## 2022-07-27 PROCEDURE — 99214 OFFICE O/P EST MOD 30 MIN: CPT | Mod: S$PBB,,, | Performed by: PHYSICIAN ASSISTANT

## 2022-07-27 PROCEDURE — 99214 PR OFFICE/OUTPT VISIT, EST, LEVL IV, 30-39 MIN: ICD-10-PCS | Mod: S$PBB,,, | Performed by: PHYSICIAN ASSISTANT

## 2022-07-27 PROCEDURE — 99999 PR PBB SHADOW E&M-EST. PATIENT-LVL III: ICD-10-PCS | Mod: PBBFAC,,, | Performed by: PHYSICIAN ASSISTANT

## 2022-07-27 PROCEDURE — 99213 OFFICE O/P EST LOW 20 MIN: CPT | Mod: PBBFAC,PO | Performed by: PHYSICIAN ASSISTANT

## 2022-07-27 PROCEDURE — 99999 PR PBB SHADOW E&M-EST. PATIENT-LVL III: CPT | Mod: PBBFAC,,, | Performed by: PHYSICIAN ASSISTANT

## 2022-07-27 NOTE — PROGRESS NOTES
Subjective:    Patient ID:  Andres Germain Jr. is a 81 y.o. male who presents for follow-up of CAD.       HPI  Mr. Germain is a very pleasant gentleman who follows with Dr. Johnson. He presents today for follow up after a recent coronary angiogram. He had a PET stress test in May that revealed a small sized, mild intensity basal to mid inferolateral and lateral resting perfusion abnormality involving 2% of LV myocardium in the distribution of the LCX. After pharmacologic stress, this perfusion abnormality is larger and more severe involving 10% of the LV myocardium, indicative of ischemia with underlying scar. He subsequently underwent coronary angiography which revealed:  Angiographic findings:  Left main coronary artery-medium size vessel normal appearance for age.  Left anterior descending-medium size well seen) apex diffuse disease throughout its entire midportion of the 45% with 1 diagonal normal appearance for age.  Circumflex-medium sized nondominant vessel normal 1st obtuse marginal large branching 2nd obtuse marginal.  Distal circumflex trivial vessel chronic total occlusion.  Questionable small dissection flap in the mid circumflex.  Right coronary artery-medium size dominant vessel diffuse disease seen throughout the entire course of the 35%.     Intravascular ultrasound of circumflex depicts large vessel mi fibrofatty calcific plaque lumen area is greater than 6 mm2.  No dissection flap no thrombus.  Attempted wire crossing the distal circumflex traverse size artery covering very small area the procedure abandoned.      We reviewed his last lipids from Oct 2021. LDL not at goal at that time. He will have them redrawn soon.     Review of Systems   Constitutional: Negative for chills, diaphoresis, fever, weight gain and weight loss.   HENT: Negative for sore throat.    Eyes: Negative for blurred vision, vision loss in left eye, vision loss in right eye and visual disturbance.   Cardiovascular:  "Negative for chest pain, claudication, dyspnea on exertion, leg swelling, near-syncope, orthopnea, palpitations, paroxysmal nocturnal dyspnea and syncope.   Respiratory: Negative for cough, hemoptysis, shortness of breath, sputum production and wheezing.    Endocrine: Negative for cold intolerance and heat intolerance.   Hematologic/Lymphatic: Negative for adenopathy. Does not bruise/bleed easily.   Skin: Negative for rash.   Musculoskeletal: Negative for falls, muscle weakness and myalgias.   Gastrointestinal: Negative for abdominal pain, change in bowel habit, constipation, diarrhea, melena and nausea.   Genitourinary: Negative for bladder incontinence.   Neurological: Negative for dizziness, focal weakness, headaches, light-headedness, numbness and weakness.   Psychiatric/Behavioral: Negative for altered mental status.         Vitals:    07/27/22 1515   BP: (!) 100/50   BP Location: Left arm   Patient Position: Sitting   BP Method: Large (Automatic)   Pulse: 66   Weight: 86.7 kg (191 lb 2.2 oz)   Height: 5' 10" (1.778 m)   Body mass index is 27.43 kg/m².    Objective:    Physical Exam  Constitutional:       Appearance: He is well-developed.   HENT:      Head: Normocephalic and atraumatic.   Eyes:      Extraocular Movements: Extraocular movements intact.      Pupils: Pupils are equal, round, and reactive to light.   Neck:      Thyroid: No thyromegaly.      Vascular: No JVD.      Trachea: No tracheal deviation.   Cardiovascular:      Rate and Rhythm: Normal rate and regular rhythm.      Chest Wall: PMI is not displaced.      Pulses: Normal pulses and intact distal pulses.      Heart sounds: S1 normal and S2 normal. No murmur heard.    No friction rub. No gallop.   Pulmonary:      Effort: Pulmonary effort is normal. No respiratory distress.      Breath sounds: Normal breath sounds. No wheezing or rales.   Chest:      Chest wall: No tenderness.   Abdominal:      General: Bowel sounds are normal. There is no " distension.      Palpations: Abdomen is soft. There is no mass.      Tenderness: There is no abdominal tenderness.   Musculoskeletal:         General: No tenderness. Normal range of motion.      Cervical back: Neck supple.   Skin:     General: Skin is warm and dry.      Findings: No rash.   Neurological:      General: No focal deficit present.      Mental Status: He is alert and oriented to person, place, and time.   Psychiatric:         Mood and Affect: Mood normal.         Behavior: Behavior normal.           Assessment:       Problem List Items Addressed This Visit        Cardiology Problems    Coronary artery disease involving native coronary artery of native heart without angina pectoris    Essential hypertension - Primary    Mixed hyperlipidemia       Other    Family H/O Early CAD             Plan:       Continue current cardiac medications.   Would increase Lipitor if LDL not at goal on upcoming lipid panel.   Discussed risk factor modification including diet and exercise.   F/U with Dr. Johnson as scheduled.

## 2022-09-19 ENCOUNTER — OFFICE VISIT (OUTPATIENT)
Dept: CARDIOLOGY | Facility: CLINIC | Age: 82
End: 2022-09-19
Payer: MEDICARE

## 2022-09-19 VITALS
HEART RATE: 61 BPM | BODY MASS INDEX: 27.94 KG/M2 | DIASTOLIC BLOOD PRESSURE: 70 MMHG | SYSTOLIC BLOOD PRESSURE: 111 MMHG | WEIGHT: 195.13 LBS | HEIGHT: 70 IN

## 2022-09-19 DIAGNOSIS — R93.1 ABNORMAL SCREENING CARDIAC CT: ICD-10-CM

## 2022-09-19 DIAGNOSIS — I77.811 AORTIC ECTASIA, ABDOMINAL: ICD-10-CM

## 2022-09-19 DIAGNOSIS — I10 ESSENTIAL HYPERTENSION: ICD-10-CM

## 2022-09-19 DIAGNOSIS — I25.10 CORONARY ARTERY DISEASE INVOLVING NATIVE CORONARY ARTERY OF NATIVE HEART WITHOUT ANGINA PECTORIS: Primary | ICD-10-CM

## 2022-09-19 DIAGNOSIS — E78.2 MIXED HYPERLIPIDEMIA: ICD-10-CM

## 2022-09-19 DIAGNOSIS — Z82.49 FAMILY HISTORY OF EARLY CAD: ICD-10-CM

## 2022-09-19 PROCEDURE — 99213 OFFICE O/P EST LOW 20 MIN: CPT | Mod: PBBFAC,PO | Performed by: INTERNAL MEDICINE

## 2022-09-19 PROCEDURE — 99214 PR OFFICE/OUTPT VISIT, EST, LEVL IV, 30-39 MIN: ICD-10-PCS | Mod: S$PBB,,, | Performed by: INTERNAL MEDICINE

## 2022-09-19 PROCEDURE — 99999 PR PBB SHADOW E&M-EST. PATIENT-LVL III: ICD-10-PCS | Mod: PBBFAC,,, | Performed by: INTERNAL MEDICINE

## 2022-09-19 PROCEDURE — 99214 OFFICE O/P EST MOD 30 MIN: CPT | Mod: S$PBB,,, | Performed by: INTERNAL MEDICINE

## 2022-09-19 PROCEDURE — 99999 PR PBB SHADOW E&M-EST. PATIENT-LVL III: CPT | Mod: PBBFAC,,, | Performed by: INTERNAL MEDICINE

## 2022-09-19 NOTE — PROGRESS NOTES
"Subjective:    Patient ID:  Andres Germain Jr. is a 82 y.o. male who presents for follow-up of Hypertension      Problem List Items Addressed This Visit          Cardiac/Vascular    Coronary artery disease involving native coronary artery of native heart without angina pectoris - Primary    Elevated Cardiac CT Calcium Score    Overview     August 2021  1154         Essential hypertension    Family H/O Early CAD    Mild Abdominal Aortic Ectasia    Mixed hyperlipidemia       HPI    Patient was last seen on 05/30/2022 at which time patient was status post PET stress test that showed ischemia in the distribution of the left circumflex artery.  Left heart catheterization was scheduled.  No intervention was performed.  Trivial distal circumflex was noted with .  Plan was for medical management.    On assessment today, the patient states that he feels OK today.    No angina         Objective:     Vitals:    09/19/22 1315   BP: 111/70   BP Location: Right arm   Patient Position: Sitting   BP Method: Large (Automatic)   Pulse: 61   Weight: 88.5 kg (195 lb 1.7 oz)   Height: 5' 10" (1.778 m)        Physical Exam  Constitutional:       Appearance: He is well-developed.   HENT:      Head: Normocephalic and atraumatic.   Neck:      Vascular: No JVD.   Cardiovascular:      Rate and Rhythm: Normal rate and regular rhythm.      Heart sounds: Normal heart sounds. No murmur heard.    No friction rub. No gallop.   Pulmonary:      Effort: Pulmonary effort is normal. No respiratory distress.      Breath sounds: Normal breath sounds. No wheezing or rales.   Abdominal:      General: Bowel sounds are normal.      Palpations: Abdomen is soft.      Tenderness: There is no abdominal tenderness. There is no guarding or rebound.   Musculoskeletal:      Cervical back: Normal range of motion and neck supple.   Skin:     General: Skin is warm and dry.   Neurological:      Mental Status: He is alert and oriented to person, place, and time. "   Psychiatric:         Behavior: Behavior normal.           Current Outpatient Medications on File Prior to Visit   Medication Sig    atorvastatin (LIPITOR) 40 MG tablet Take 1 tablet (40 mg total) by mouth every evening. (Patient taking differently: Take 40 mg by mouth once daily.)    cyanocobalamin (VITAMIN B-12) 1000 MCG tablet Take 100 mcg by mouth once daily.    finasteride (PROSCAR) 5 mg tablet Take 1 tablet (5 mg total) by mouth once daily.    flaxseed oil 1,000 mg Cap Take by mouth.    isosorbide mononitrate (IMDUR) 30 MG 24 hr tablet Take 1 tablet (30 mg total) by mouth once daily.    losartan-hydrochlorothiazide 100-12.5 mg (HYZAAR) 100-12.5 mg Tab Take 1 tablet by mouth once daily.    montelukast (SINGULAIR) 10 mg tablet Take 1 tablet (10 mg total) by mouth every evening.    multivitamin with minerals tablet Take 1 tablet by mouth once daily.    omega-3 fatty acids/fish oil (FISH OIL-OMEGA-3 FATTY ACIDS) 300-1,000 mg capsule Take 1 capsule by mouth once daily.    pyridoxine, vitamin B6, (B-6) 100 MG Tab Take 50 mg by mouth once daily.    tamsulosin (FLOMAX) 0.4 mg Cap Take 1 capsule (0.4 mg total) by mouth once daily. (Patient taking differently: Take 0.4 mg by mouth every evening.)    vitamin D (VITAMIN D3) 1000 units Tab Take 1,000 Units by mouth once daily.    acetaminophen (TYLENOL) 325 MG tablet Take 1 tablet (325 mg total) by mouth every 6 (six) hours as needed for Pain.    aspirin (ECOTRIN) 81 MG EC tablet Take 1 tablet (81 mg total) by mouth once daily.     Current Facility-Administered Medications on File Prior to Visit   Medication    sodium chloride 0.9% flush 10 mL       Lipid Panel:   Lab Results   Component Value Date    CHOL 172 10/14/2021    HDL 54 10/14/2021    LDLCALC 98.0 10/14/2021    TRIG 100 10/14/2021    CHOLHDL 31.4 10/14/2021       The ASCVD Risk score (Stuart DK, et al., 2019) failed to calculate for the following reasons:    The 2019 ASCVD risk score is only valid for ages 40 to  79    All pertinent labs, imaging, and EKGs reviewed.  Patient's most recent EKG tracing was personally interpreted by this provider.    Most Recent Echocardiogram Results  Results for orders placed in visit on 11/16/21    Echo    Interpretation Summary  · The left ventricle is normal in size with concentric remodeling and normal systolic function.  · The estimated ejection fraction is 65%.  · Normal left ventricular diastolic function.  · Normal right ventricular size with normal right ventricular systolic function.  · Normal central venous pressure (3 mmHg).  · The estimated PA systolic pressure is 26 mmHg.        Most Recent EKG  Results for orders placed or performed during the hospital encounter of 06/28/22   EKG 12-lead    Collection Time: 06/28/22  7:23 AM    Narrative    Test Reason : R94.39,    Vent. Rate : 054 BPM     Atrial Rate : 054 BPM     P-R Int : 186 ms          QRS Dur : 096 ms      QT Int : 424 ms       P-R-T Axes : 060 -13 010 degrees     QTc Int : 402 ms    Sinus bradycardia  Otherwise normal ECG  When compared with ECG of 24-MAY-2022 11:01,  Previous ECG has undetermined rhythm, needs review  Confirmed by Alex GOODMAN, Gigi ARITA ([id number]) on 6/28/2022 8:54:42 AM    Referred By: MEDINA SOUTH           Confirmed By:Gigi Johnson MD       No valid procedures specified.   Results for orders placed during the hospital encounter of 11/16/21    Nuclear Stress - Cardiology Interpreted    Interpretation Summary    Abnormal myocardial perfusion scan.    There is a mild intensity, small sized, reversible defect that is consistent with ischemia in the anteroseptal wall(s).    The gated perfusion images showed an ejection fraction of 66% at rest.    There is normal wall motion at rest.    The EKG portion of this study is negative for ischemia.    During stress, rare PVCs are noted.    There are no prior studies for comparison.    No valid procedures specified.      Assessment:       1. Coronary artery disease  involving native coronary artery of native heart without angina pectoris    2. Elevated Cardiac CT Calcium Score    3. Essential hypertension    4. Family H/O Early CAD    5. Mild Abdominal Aortic Ectasia    6. Mixed hyperlipidemia         Plan:     Symptoms OK today  BP/Pulse OK today  Most recent echocardiogram reviewed personally     Continue aspirin 81 mg PO Daily   Continue atorvastatin 40 mg PO Daily   Continue Imdur 30 mg PO Daily    Continue losartan-hydrochlorothiazide 100-12.5 mg PO Daily     Continue other cardiac medications  Mediterranean Diet/Cardiovascular Exercise Program    Patient queried and all questions were answered.    F/u in 6-9 months to reassess      Signed:    Gigi Johnson MD  9/19/2022 8:02 AM

## 2022-10-19 PROBLEM — R73.9 HYPERGLYCEMIA: Status: ACTIVE | Noted: 2022-10-19

## 2022-10-19 PROBLEM — H91.90 HOH (HARD OF HEARING): Status: ACTIVE | Noted: 2022-10-19

## 2023-03-02 DIAGNOSIS — I10 ESSENTIAL HYPERTENSION: ICD-10-CM

## 2023-03-02 NOTE — TELEPHONE ENCOUNTER
----- Message from Pastora Soto sent at 3/2/2023  2:40 PM CST -----  Regarding: refill  Contact: patient  Type:  RX Refill Request    Who Called:  patient  Refill or New Rx: refill  RX Name and Strength:  isosorbide mononitrate (IMDUR) 30 MG 24 hr tablet  How is the patient currently taking it? (ex. 1XDay):  1xday  Is this a 30 day or 90 day RX:  90  Preferred Pharmacy with phone number:    [facility] - [place] - [facility] AT [address] [facility]  [place] [address]  Phone: [phone] Fax: [phone]  Local or Mail Order:  mail order  Ordering Provider:  Dr Alex Ramos Call Back Number:  [phone] (home)   Additional Information:  please call patient if any questions. Thanks!

## 2023-03-03 RX ORDER — ISOSORBIDE MONONITRATE 30 MG/1
30 TABLET, EXTENDED RELEASE ORAL DAILY
Qty: 90 TABLET | Refills: 3 | Status: SHIPPED | OUTPATIENT
Start: 2023-03-03 | End: 2023-04-19 | Stop reason: SDUPTHER

## 2023-07-19 PROBLEM — J84.10 PULMONARY GRANULOMA: Status: ACTIVE | Noted: 2023-07-19

## 2023-07-24 ENCOUNTER — HOSPITAL ENCOUNTER (OUTPATIENT)
Dept: RADIOLOGY | Facility: HOSPITAL | Age: 83
Discharge: HOME | End: 2023-07-24
Attending: NURSE PRACTITIONER
Payer: MEDICARE

## 2023-07-24 DIAGNOSIS — N63.15 MASS OVERLAPPING MULTIPLE QUADRANTS OF RIGHT BREAST: ICD-10-CM

## 2023-07-24 PROCEDURE — 77066 MAMMO DIGITAL DIAGNOSTIC BILAT WITH TOMO: ICD-10-PCS | Mod: 26,,, | Performed by: RADIOLOGY

## 2023-07-24 PROCEDURE — 77062 MAMMO DIGITAL DIAGNOSTIC BILAT WITH TOMO: ICD-10-PCS | Mod: 26,,, | Performed by: RADIOLOGY

## 2023-07-24 PROCEDURE — 77062 BREAST TOMOSYNTHESIS BI: CPT | Mod: 26,,, | Performed by: RADIOLOGY

## 2023-07-24 PROCEDURE — 76642 ULTRASOUND BREAST LIMITED: CPT | Mod: TC,50,PO

## 2023-07-24 PROCEDURE — 77062 BREAST TOMOSYNTHESIS BI: CPT | Mod: TC,PO

## 2023-07-24 PROCEDURE — 77066 DX MAMMO INCL CAD BI: CPT | Mod: 26,,, | Performed by: RADIOLOGY

## 2023-07-24 PROCEDURE — 76642 ULTRASOUND BREAST LIMITED: CPT | Mod: 26,50,, | Performed by: RADIOLOGY

## 2023-07-24 PROCEDURE — 76642 US BREAST BILATERAL LIMITED: ICD-10-PCS | Mod: 26,50,, | Performed by: RADIOLOGY

## 2023-07-24 NOTE — PROGRESS NOTES
I placed an urgent referral to breast specialist clinic so they can do the follow up required for these findings; please let patient know, so he can get that scheduled if he has not already done so.

## 2023-08-14 ENCOUNTER — OFFICE VISIT (OUTPATIENT)
Dept: CARDIOLOGY | Facility: CLINIC | Age: 83
End: 2023-08-14
Payer: MEDICARE

## 2023-08-14 VITALS
BODY MASS INDEX: 28.12 KG/M2 | HEIGHT: 70 IN | DIASTOLIC BLOOD PRESSURE: 64 MMHG | SYSTOLIC BLOOD PRESSURE: 130 MMHG | HEART RATE: 54 BPM | WEIGHT: 196.44 LBS

## 2023-08-14 DIAGNOSIS — Z82.49 FAMILY HISTORY OF EARLY CAD: ICD-10-CM

## 2023-08-14 DIAGNOSIS — I25.10 CORONARY ARTERY DISEASE INVOLVING NATIVE CORONARY ARTERY OF NATIVE HEART WITHOUT ANGINA PECTORIS: Primary | ICD-10-CM

## 2023-08-14 DIAGNOSIS — I77.811 AORTIC ECTASIA, ABDOMINAL: ICD-10-CM

## 2023-08-14 DIAGNOSIS — R93.1 ABNORMAL SCREENING CARDIAC CT: ICD-10-CM

## 2023-08-14 DIAGNOSIS — E78.2 MIXED HYPERLIPIDEMIA: ICD-10-CM

## 2023-08-14 DIAGNOSIS — I10 ESSENTIAL HYPERTENSION: ICD-10-CM

## 2023-08-14 PROCEDURE — 93005 ELECTROCARDIOGRAM TRACING: CPT | Mod: PO

## 2023-08-14 PROCEDURE — 93010 ELECTROCARDIOGRAM REPORT: CPT | Mod: ,,, | Performed by: INTERNAL MEDICINE

## 2023-08-14 PROCEDURE — 93010 EKG 12-LEAD: ICD-10-PCS | Mod: ,,, | Performed by: INTERNAL MEDICINE

## 2023-08-14 PROCEDURE — 99999 PR PBB SHADOW E&M-EST. PATIENT-LVL III: CPT | Mod: PBBFAC,,, | Performed by: INTERNAL MEDICINE

## 2023-08-14 PROCEDURE — 99214 OFFICE O/P EST MOD 30 MIN: CPT | Mod: S$PBB,25,, | Performed by: INTERNAL MEDICINE

## 2023-08-14 PROCEDURE — 99214 PR OFFICE/OUTPT VISIT, EST, LEVL IV, 30-39 MIN: ICD-10-PCS | Mod: S$PBB,25,, | Performed by: INTERNAL MEDICINE

## 2023-08-14 PROCEDURE — 99213 OFFICE O/P EST LOW 20 MIN: CPT | Mod: PBBFAC,PO | Performed by: INTERNAL MEDICINE

## 2023-08-14 PROCEDURE — 99999 PR PBB SHADOW E&M-EST. PATIENT-LVL III: ICD-10-PCS | Mod: PBBFAC,,, | Performed by: INTERNAL MEDICINE

## 2023-08-14 NOTE — PROGRESS NOTES
"Subjective:    Patient ID:  Andres Germain Jr. is a 82 y.o. male who presents for follow-up of Coronary Artery Disease      Problem List Items Addressed This Visit          Cardiac/Vascular    Coronary artery disease involving native coronary artery of native heart without angina pectoris - Primary    Elevated Cardiac CT Calcium Score    Overview     August 2021  1154         Essential hypertension    Family H/O Early CAD    Mild Abdominal Aortic Ectasia    Mixed hyperlipidemia       HPI    Patient was last seen on 09/19/2022 at which time he was doing okay from a cardiac standpoint.  Was not having angina.    On assessment today, the patient states that he feels OK today.    No angina       Objective:     Vitals:    08/14/23 1027   BP: 130/64   BP Location: Left arm   Patient Position: Sitting   BP Method: Large (Automatic)   Pulse: (!) 54   Weight: 89.1 kg (196 lb 6.9 oz)   Height: 5' 10" (1.778 m)       BP Readings from Last 5 Encounters:   08/14/23 130/64   07/19/23 124/60   04/19/23 130/78   10/19/22 136/60   09/19/22 111/70        Physical Exam  Constitutional:       Appearance: He is well-developed.   HENT:      Head: Normocephalic and atraumatic.   Neck:      Vascular: No JVD.   Cardiovascular:      Rate and Rhythm: Normal rate and regular rhythm.      Heart sounds: Normal heart sounds. No murmur heard.     No friction rub. No gallop.   Pulmonary:      Effort: Pulmonary effort is normal. No respiratory distress.      Breath sounds: Normal breath sounds. No wheezing or rales.   Abdominal:      General: Bowel sounds are normal.      Palpations: Abdomen is soft.      Tenderness: There is no abdominal tenderness. There is no guarding or rebound.   Musculoskeletal:      Cervical back: Normal range of motion and neck supple.   Skin:     General: Skin is warm and dry.   Neurological:      Mental Status: He is alert and oriented to person, place, and time.   Psychiatric:         Behavior: Behavior normal.        "      Current Outpatient Medications   Medication Instructions    aspirin (ECOTRIN) 81 mg, Oral, Daily    atorvastatin (LIPITOR) 40 mg, Oral, Nightly    cyanocobalamin (VITAMIN B-12) 100 mcg, Oral, Daily    finasteride (PROSCAR) 5 mg, Oral, Daily    flaxseed oil 1,000 mg Cap Oral    isosorbide mononitrate (IMDUR) 30 mg, Oral, Daily    losartan-hydrochlorothiazide 100-12.5 mg (HYZAAR) 100-12.5 mg Tab 1 tablet, Oral, Daily    montelukast (SINGULAIR) 10 mg, Oral, Nightly    multivitamin with minerals tablet 1 tablet, Oral, Daily    omega-3 fatty acids/fish oil (FISH OIL-OMEGA-3 FATTY ACIDS) 300-1,000 mg capsule 1 capsule, Oral, Daily    pyridoxine (vitamin B6) (B-6) 50 mg, Oral, Daily    tamsulosin (FLOMAX) 0.4 mg, Oral, Nightly    vitamin D (VITAMIN D3) 1,000 Units, Oral, Daily       Lipid Panel:   Lab Results   Component Value Date    CHOL 146 10/17/2022    HDL 54 10/17/2022    LDLCALC 64.2 10/17/2022    TRIG 139 10/17/2022    CHOLHDL 37.0 10/17/2022       The ASCVD Risk score (Simpson DK, et al., 2019) failed to calculate for the following reasons:    The 2019 ASCVD risk score is only valid for ages 40 to 79    All pertinent labs, imaging, and EKGs reviewed.  Patient's most recent EKG tracing was personally interpreted by this provider.    Most Recent EKG Results  Results for orders placed or performed during the hospital encounter of 06/28/22   EKG 12-lead    Collection Time: 06/28/22  7:23 AM    Narrative    Test Reason : R94.39,    Vent. Rate : 054 BPM     Atrial Rate : 054 BPM     P-R Int : 186 ms          QRS Dur : 096 ms      QT Int : 424 ms       P-R-T Axes : 060 -13 010 degrees     QTc Int : 402 ms    Sinus bradycardia  Otherwise normal ECG  When compared with ECG of 24-MAY-2022 11:01,  Previous ECG has undetermined rhythm, needs review  Confirmed by Alex GOODMAN, Gigi ARITA ([id number]) on 6/28/2022 8:54:42 AM    Referred By: MEDINA SOUTH           Confirmed By:Gigi Johnson MD       Most Recent Echocardiogram  Results  Results for orders placed in visit on 11/16/21    Echo    Interpretation Summary  · The left ventricle is normal in size with concentric remodeling and normal systolic function.  · The estimated ejection fraction is 65%.  · Normal left ventricular diastolic function.  · Normal right ventricular size with normal right ventricular systolic function.  · Normal central venous pressure (3 mmHg).  · The estimated PA systolic pressure is 26 mmHg.      Most Recent Nuclear Stress Test Results  Results for orders placed during the hospital encounter of 11/16/21    Nuclear Stress - Cardiology Interpreted    Interpretation Summary    Abnormal myocardial perfusion scan.    There is a mild intensity, small sized, reversible defect that is consistent with ischemia in the anteroseptal wall(s).    The gated perfusion images showed an ejection fraction of 66% at rest.    There is normal wall motion at rest.    The EKG portion of this study is negative for ischemia.    During stress, rare PVCs are noted.    There are no prior studies for comparison.      Most Recent Cardiac PET Stress Test Results  Results for orders placed during the hospital encounter of 05/24/22    Cardiac PET Scan Stress    Interpretation Summary    There is a small sized, mild intensity basal to mid inferolateral and lateral resting perfusion abnormality involving 2% of LV myocardium in the distribution of the LCX. After pharmacologic stress, this perfusion abnormality is larger and more severe involving 10% of the LV myocardium, indicative of ischemia with underlying scar.    There are no other significant perfusion abnormalities.    The whole heart absolute myocardial perfusion values averaged 0.52 cc/min/g at rest, which is reduced; 0.98 cc/min/g at stress, which is moderately reduced; and CFR is 1.89 , which is mildly reduced.    CT attenuation images demonstrate mild diffuse coronary calcifications in the LAD, LCX and RCA territory and moderate  diffuse aortic calcifications in the descending aorta.    The gated perfusion images showed an ejection fraction of 71% at rest and 51% during stress. A normal ejection fraction is greater than 53%.    The wall motion is normal at rest and during stress.    There is inferolateral wall hypokinesis during stress.    The LV cavity size is normal at rest and stress.    The EKG portion of this study is negative for ischemia.    During stress, rare PVCs are noted.    The patient reported no chest pain during the stress test.    There are no prior studies for comparison.      Most Recent Cardiovascular Angiogram results  Results for orders placed during the hospital encounter of 06/28/22    Interventional Radiology    Narrative  Procedures performed:  Coronary angiography  Intravascular ultrasound of circumflex  Attempted wire crossing the distal circumflex chronic total occlusion.    Angiographic findings:  Left main coronary artery-medium size vessel normal appearance for age.  Left anterior descending-medium size well seen) apex diffuse disease throughout its entire midportion of the 45% with 1 diagonal normal appearance for age.  Circumflex-medium sized nondominant vessel normal 1st obtuse marginal large branching 2nd obtuse marginal.  Distal circumflex trivial vessel chronic total occlusion.  Questionable small dissection flap in the mid circumflex.  Right coronary artery-medium size dominant vessel diffuse disease seen throughout the entire course of the 35%.    Intravascular ultrasound of circumflex depicts large vessel mi fibrofatty calcific plaque lumen area is greater than 6 mm2.  No dissection flap no thrombus.  Attempted wire crossing the distal circumflex traverse size artery covering very small area the procedure abandoned.      Other Most Recent Cardiology Results  Results for orders placed during the hospital encounter of 05/24/22    CARDIOLOGY REPORT        Assessment:       1. Coronary artery disease  involving native coronary artery of native heart without angina pectoris    2. Elevated Cardiac CT Calcium Score    3. Essential hypertension    4. Family H/O Early CAD    5. Mild Abdominal Aortic Ectasia    6. Mixed hyperlipidemia         Plan:     Symptoms OK today  BP/Pulse OK today  Most recent echocardiogram reviewed personally     Continue aspirin 81 mg PO Daily  Continue atorvastatin 40 mg PO Daily   Continue Imdur 30 mg PO Daily   Continue losartan-hydrochlorothiazide 100-12.5 mg PO Daily    Continue other cardiac medications  Mediterranean Diet/Cardiovascular Exercise Program    Patient queried and all questions were answered.    F/u in 1 year to reassess      Signed:    Gigi Johnson MD  8/14/2023 8:59 PM

## 2023-09-01 ENCOUNTER — TELEPHONE (OUTPATIENT)
Dept: CARDIOLOGY | Facility: CLINIC | Age: 83
End: 2023-09-01
Payer: MEDICARE

## 2023-09-01 NOTE — TELEPHONE ENCOUNTER
Please advise: today pt was feeling sluggish so he took his blood pressure and it was 107/50. Checked it again a couple hours later and it was 119/58. He says his blood pressure cuff does not give him the heart rate. He does not take his pressure routinely at home.  Asking if he can shave 20% or so off his tablet

## 2023-09-01 NOTE — TELEPHONE ENCOUNTER
----- Message from Gaurav Hoffman sent at 9/1/2023  3:15 PM CDT -----  Contact: self  Type: Needs Medical Advice  Who Called: Patient   Best Call Back Number: [phone]  Additional Information:Pt just wants to know can he cut his  Put BP pills in half since he has low blood pressure. Thanks

## 2023-09-05 NOTE — TELEPHONE ENCOUNTER
Pt feels he was dehydrated--pressure after taking to clinic was back up again. He is going to take his pressure for a few days then call with an update

## 2023-10-25 PROBLEM — Z00.00 PREVENTATIVE HEALTH CARE: Status: RESOLVED | Noted: 2023-10-25 | Resolved: 2023-10-25

## 2023-10-25 PROBLEM — Z00.00 PREVENTATIVE HEALTH CARE: Status: ACTIVE | Noted: 2023-10-25

## 2023-11-16 PROBLEM — H92.01 ACUTE OTALGIA, RIGHT: Status: ACTIVE | Noted: 2023-11-16

## 2023-11-16 PROBLEM — H66.91 RIGHT OTITIS MEDIA: Status: ACTIVE | Noted: 2023-11-16

## 2024-05-10 ENCOUNTER — TELEPHONE (OUTPATIENT)
Dept: CARDIOLOGY | Facility: CLINIC | Age: 84
End: 2024-05-10
Payer: MEDICARE

## 2024-05-10 NOTE — TELEPHONE ENCOUNTER
Spoke to pt and advised per Dr Yu (I am ok with BPs around 111/60 as long as he feels well.     If he ever gets issues of No dizziness, lightheadedness, almost passing out, or passing out, please let use know.)

## 2024-05-10 NOTE — TELEPHONE ENCOUNTER
Please advise: pt called concerned about his diastolic pressures being low: BP last two days 130s/upper 50s. On 5/1 his Hyzaar was reduced from 100-12.5 to 50-12.5 by his PCP for a systolic pressure of 112 in the office. He says that week his pressures were running 110s/60s. He says he is feeling fine

## 2024-08-12 NOTE — PROGRESS NOTES
"Subjective:    Patient ID:  Andres Germain Jr. is a 83 y.o. male who presents for follow-up of Hypertension and Hyperlipidemia      Problem List Items Addressed This Visit          Cardiac/Vascular    Coronary artery disease involving native coronary artery of native heart without angina pectoris - Primary    Elevated Cardiac CT Calcium Score    Overview     August 2021  1154         Essential hypertension    Relevant Medications    isosorbide mononitrate (IMDUR) 30 MG 24 hr tablet    Mild Abdominal Aortic Ectasia    Mixed hyperlipidemia     Other Visit Diagnoses       Fatigue, unspecified type                HPI    Patient was last seen on 08/14/2023 at which time he was doing okay from a cardiac standpoint.    On assessment today, the patient states that he feels Ok today.    Had an episode about a week ago of. The night before, he was freezing cold. When he got up in the morning he could barely walk, had a shuffling gate. Wife made him use his cane. Lasted for 1-2 days. Resolved on its own.    No chest pain.  No shortness of breath.         Objective:     Vitals:    08/15/24 1025   BP: 117/61   BP Location: Left arm   Patient Position: Sitting   BP Method: Large (Automatic)   Pulse: 63   Weight: 89 kg (196 lb 3.4 oz)   Height: 5' 10" (1.778 m)       BP Readings from Last 5 Encounters:   08/15/24 117/61   04/29/24 114/62   02/01/24 130/82   11/16/23 122/60   10/25/23 128/62        Physical Exam  Constitutional:       Appearance: He is well-developed.   HENT:      Head: Normocephalic and atraumatic.   Neck:      Vascular: No JVD.   Cardiovascular:      Rate and Rhythm: Normal rate and regular rhythm.      Heart sounds: Normal heart sounds. No murmur heard.     No friction rub. No gallop.   Pulmonary:      Effort: Pulmonary effort is normal. No respiratory distress.      Breath sounds: Normal breath sounds. No wheezing or rales.   Abdominal:      General: Bowel sounds are normal.      Palpations: Abdomen is " soft.      Tenderness: There is no abdominal tenderness. There is no guarding or rebound.   Musculoskeletal:      Cervical back: Normal range of motion and neck supple.   Skin:     General: Skin is warm and dry.   Neurological:      Mental Status: He is alert and oriented to person, place, and time.   Psychiatric:         Behavior: Behavior normal.             Current Outpatient Medications   Medication Instructions    albuterol (PROVENTIL/VENTOLIN HFA) 90 mcg/actuation inhaler 2 puffs, Every 4 hours PRN    aspirin (ECOTRIN) 81 mg, Oral, Daily    atorvastatin (LIPITOR) 40 mg, Oral, Nightly    BREYNA 160-4.5 mcg/actuation HFAA 2 puffs, 2 times daily    cyanocobalamin (VITAMIN B-12) 100 mcg, Oral, Daily    finasteride (PROSCAR) 5 mg, Oral    flaxseed oil 1,000 mg Cap Oral    fluticasone propionate (FLONASE) 100 mcg, Each Nostril, Daily PRN    isosorbide mononitrate (IMDUR) 30 mg, Oral, Daily    losartan-hydrochlorothiazide 50-12.5 mg (HYZAAR) 50-12.5 mg per tablet 1 tablet, Oral, Daily    montelukast (SINGULAIR) 10 mg, Oral, Nightly    multivitamin with minerals tablet 1 tablet, Oral, Daily    omega-3 fatty acids/fish oil (FISH OIL-OMEGA-3 FATTY ACIDS) 300-1,000 mg capsule 1 capsule, Oral, Daily    pyridoxine (vitamin B6) (B-6) 50 mg, Oral, Daily    tamsulosin (FLOMAX) 0.4 mg, Oral, Nightly    vitamin D (VITAMIN D3) 1,000 Units, Oral, Daily       Lipid Panel:   Lab Results   Component Value Date    CHOL 155 10/24/2023    HDL 50 10/24/2023    LDLCALC 79.6 10/24/2023    TRIG 127 10/24/2023    CHOLHDL 32.3 10/24/2023       The ASCVD Risk score (Stuart DK, et al., 2019) failed to calculate for the following reasons:    The 2019 ASCVD risk score is only valid for ages 40 to 79    Most Recent EKG Results  Results for orders placed or performed in visit on 08/14/23   IN OFFICE EKG 12-LEAD (to [place])    Collection Time: 08/14/23 10:26 AM    Narrative    Test Reason : z00.0    Vent. Rate : 053 BPM     Atrial Rate : 053 BPM      P-R Int : 190 ms          QRS Dur : 080 ms      QT Int : 418 ms       P-R-T Axes : 049 -17 -14 degrees     QTc Int : 392 ms    Sinus bradycardia  Minimal voltage criteria for LVH, may be normal variant ( R in aVL )  Borderline Abnormal ECG  When compared with ECG of 28-JUN-2022 07:23,  Nonspecific T wave abnormality now evident in Lateral leads  Confirmed by Alex GOODMAN, Gigi ARITA ([id number]) on 8/14/2023 1:09:29 PM    Referred By:             Confirmed By:Gigi Johnson MD       Most Recent Echocardiogram Results  Results for orders placed in visit on 11/16/21    Echo    Interpretation Summary  · The left ventricle is normal in size with concentric remodeling and normal systolic function.  · The estimated ejection fraction is 65%.  · Normal left ventricular diastolic function.  · Normal right ventricular size with normal right ventricular systolic function.  · Normal central venous pressure (3 mmHg).  · The estimated PA systolic pressure is 26 mmHg.      Most Recent Nuclear Stress Test Results  Results for orders placed during the hospital encounter of 11/16/21    Nuclear Stress - Cardiology Interpreted    Interpretation Summary    Abnormal myocardial perfusion scan.    There is a mild intensity, small sized, reversible defect that is consistent with ischemia in the anteroseptal wall(s).    The gated perfusion images showed an ejection fraction of 66% at rest.    There is normal wall motion at rest.    The EKG portion of this study is negative for ischemia.    During stress, rare PVCs are noted.    There are no prior studies for comparison.      Most Recent Cardiac PET Stress Test Results  Results for orders placed during the hospital encounter of 05/24/22    Cardiac PET Scan Stress    Interpretation Summary    There is a small sized, mild intensity basal to mid inferolateral and lateral resting perfusion abnormality involving 2% of LV myocardium in the distribution of the LCX. After pharmacologic stress, this  perfusion abnormality is larger and more severe involving 10% of the LV myocardium, indicative of ischemia with underlying scar.    There are no other significant perfusion abnormalities.    The whole heart absolute myocardial perfusion values averaged 0.52 cc/min/g at rest, which is reduced; 0.98 cc/min/g at stress, which is moderately reduced; and CFR is 1.89 , which is mildly reduced.    CT attenuation images demonstrate mild diffuse coronary calcifications in the LAD, LCX and RCA territory and moderate diffuse aortic calcifications in the descending aorta.    The gated perfusion images showed an ejection fraction of 71% at rest and 51% during stress. A normal ejection fraction is greater than 53%.    The wall motion is normal at rest and during stress.    There is inferolateral wall hypokinesis during stress.    The LV cavity size is normal at rest and stress.    The EKG portion of this study is negative for ischemia.    During stress, rare PVCs are noted.    The patient reported no chest pain during the stress test.    There are no prior studies for comparison.      Most Recent Cardiovascular Angiogram results  Results for orders placed during the hospital encounter of 06/28/22    Interventional Radiology    Narrative  Procedures performed:  Coronary angiography  Intravascular ultrasound of circumflex  Attempted wire crossing the distal circumflex chronic total occlusion.    Angiographic findings:  Left main coronary artery-medium size vessel normal appearance for age.  Left anterior descending-medium size well seen) apex diffuse disease throughout its entire midportion of the 45% with 1 diagonal normal appearance for age.  Circumflex-medium sized nondominant vessel normal 1st obtuse marginal large branching 2nd obtuse marginal.  Distal circumflex trivial vessel chronic total occlusion.  Questionable small dissection flap in the mid circumflex.  Right coronary artery-medium size dominant vessel diffuse disease  seen throughout the entire course of the 35%.    Intravascular ultrasound of circumflex depicts large vessel mi fibrofatty calcific plaque lumen area is greater than 6 mm2.  No dissection flap no thrombus.  Attempted wire crossing the distal circumflex traverse size artery covering very small area the procedure abandoned.      Other Most Recent Cardiology Results  Results for orders placed during the hospital encounter of 05/24/22    CARDIOLOGY REPORT        All pertinent data including labs, imaging, EKGs, and studies listed above were reviewed.  Patient's most recent EKG tracing was personally interpreted by this provider.    Assessment:       1. Coronary artery disease involving native coronary artery of native heart without angina pectoris    2. Elevated Cardiac CT Calcium Score    3. Essential hypertension    4. Mixed hyperlipidemia    5. Mild Abdominal Aortic Ectasia    6. Fatigue, unspecified type         Plan for treatment of the above diagnoses:     Symptoms OK from a cardiac standpoint. Interesting event described, possibly viral? BP was good those days  BP/Pulse OK today  Most recent echocardiogram reviewed personally     Echocardiogram prior to next visit   Continue aspirin 81 mg PO Daily   Continue atorvastatin 40 mg PO Daily   Continue Imdur 30 mg PO Daily   Continue losartan-hydrochlorothiazide 50-12.5 mg PO Daily  If issue recurs, consider Neurology referral    Continue other cardiac medications  Mediterranean Diet/Cardiovascular Exercise Program    Visit today included increased complexity associated with the care of the episodic problem(s) addressed above in addition to managing the longitudinal care of the patient due to the serious and/or complex managed problem(s) listed above.    Patient queried and all questions were answered.    F/u in 1 year to reassess with echo prior      Signed:    Gigi Johnson MD  8/15/2024 8:22 AM

## 2024-08-15 ENCOUNTER — OFFICE VISIT (OUTPATIENT)
Dept: CARDIOLOGY | Facility: CLINIC | Age: 84
End: 2024-08-15
Payer: MEDICARE

## 2024-08-15 VITALS
HEART RATE: 63 BPM | BODY MASS INDEX: 28.09 KG/M2 | HEIGHT: 70 IN | WEIGHT: 196.19 LBS | SYSTOLIC BLOOD PRESSURE: 117 MMHG | DIASTOLIC BLOOD PRESSURE: 61 MMHG

## 2024-08-15 DIAGNOSIS — R93.1 ABNORMAL SCREENING CARDIAC CT: ICD-10-CM

## 2024-08-15 DIAGNOSIS — I25.10 CORONARY ARTERY DISEASE INVOLVING NATIVE CORONARY ARTERY OF NATIVE HEART WITHOUT ANGINA PECTORIS: Primary | ICD-10-CM

## 2024-08-15 DIAGNOSIS — I10 ESSENTIAL HYPERTENSION: ICD-10-CM

## 2024-08-15 DIAGNOSIS — I77.811 AORTIC ECTASIA, ABDOMINAL: ICD-10-CM

## 2024-08-15 DIAGNOSIS — R53.83 FATIGUE, UNSPECIFIED TYPE: ICD-10-CM

## 2024-08-15 DIAGNOSIS — E78.2 MIXED HYPERLIPIDEMIA: ICD-10-CM

## 2024-08-15 PROCEDURE — 99214 OFFICE O/P EST MOD 30 MIN: CPT | Mod: PBBFAC,PO | Performed by: INTERNAL MEDICINE

## 2024-08-15 PROCEDURE — 99214 OFFICE O/P EST MOD 30 MIN: CPT | Mod: S$PBB,,, | Performed by: INTERNAL MEDICINE

## 2024-08-15 PROCEDURE — 99999 PR PBB SHADOW E&M-EST. PATIENT-LVL IV: CPT | Mod: PBBFAC,,, | Performed by: INTERNAL MEDICINE

## 2024-08-15 PROCEDURE — G2211 COMPLEX E/M VISIT ADD ON: HCPCS | Mod: S$PBB,,, | Performed by: INTERNAL MEDICINE

## 2024-08-15 RX ORDER — ISOSORBIDE MONONITRATE 30 MG/1
30 TABLET, EXTENDED RELEASE ORAL DAILY
Qty: 90 TABLET | Refills: 3 | Status: SHIPPED | OUTPATIENT
Start: 2024-08-15

## 2024-10-28 PROBLEM — H66.91 RIGHT OTITIS MEDIA: Status: RESOLVED | Noted: 2023-11-16 | Resolved: 2024-10-28

## 2024-10-28 PROBLEM — I25.10 CORONARY ARTERY DISEASE INVOLVING NATIVE CORONARY ARTERY OF NATIVE HEART WITHOUT ANGINA PECTORIS: Status: RESOLVED | Noted: 2022-07-27 | Resolved: 2024-10-28

## 2024-10-29 ENCOUNTER — PATIENT MESSAGE (OUTPATIENT)
Dept: OTOLARYNGOLOGY | Facility: CLINIC | Age: 84
End: 2024-10-29
Payer: MEDICARE

## 2024-10-31 ENCOUNTER — TELEPHONE (OUTPATIENT)
Dept: NEUROLOGY | Facility: CLINIC | Age: 84
End: 2024-10-31
Payer: MEDICARE

## 2024-11-10 PROBLEM — H92.01 ACUTE OTALGIA, RIGHT: Status: RESOLVED | Noted: 2023-11-16 | Resolved: 2024-11-10

## 2025-02-05 ENCOUNTER — OFFICE VISIT (OUTPATIENT)
Dept: NEUROLOGY | Facility: CLINIC | Age: 85
End: 2025-02-05
Payer: MEDICARE

## 2025-02-05 VITALS
HEIGHT: 70 IN | BODY MASS INDEX: 28.77 KG/M2 | HEART RATE: 60 BPM | WEIGHT: 200.94 LBS | SYSTOLIC BLOOD PRESSURE: 143 MMHG | RESPIRATION RATE: 14 BRPM | DIASTOLIC BLOOD PRESSURE: 66 MMHG

## 2025-02-05 DIAGNOSIS — R41.3 SHORT-TERM MEMORY LOSS: Primary | ICD-10-CM

## 2025-02-05 DIAGNOSIS — R41.3 OTHER AMNESIA: ICD-10-CM

## 2025-02-05 PROCEDURE — 99999 PR PBB SHADOW E&M-EST. PATIENT-LVL V: CPT | Mod: PBBFAC,,, | Performed by: PSYCHIATRY & NEUROLOGY

## 2025-02-05 PROCEDURE — 99205 OFFICE O/P NEW HI 60 MIN: CPT | Mod: S$PBB,,, | Performed by: PSYCHIATRY & NEUROLOGY

## 2025-02-05 PROCEDURE — 99215 OFFICE O/P EST HI 40 MIN: CPT | Mod: PBBFAC,PO | Performed by: PSYCHIATRY & NEUROLOGY

## 2025-02-05 NOTE — PROGRESS NOTES
Date: 2/5/2025    Patient ID: Andres Germain Jr. is a 84 y.o. male.    Referring Provider:  Dr. Thomas    Chief Complaint: Memory Loss (New patient)      History of Present Illness:  Mr. Germain is a 84 y.o. male who presents today for evaluation of memory concerns. The patient was accompanied by his wife who also contributed to the following history.     He has had short term memory loss for 2-3 years, progressively worsening. His wife notes he will forget conversations sometimes. His hearing is poor but he will forget conversations that he was involved in so the wife notes it is more poor memory than poor hearing. His long term memory is intact. He recalls names well, even people he worked with many years ago. He pays the bills and does fine with that for the most part but he did catch himself forgetting to make note of a deposit he made. His wife notes that he accidentally gave three donations to [employer] last year instead of just 1 donation like they typically do as he forgot he gave the other donations. He will sometimes get a little argumentative when his wife tries to correct him.     He has some shaking in his hands. Sometimes if he is holding a plate or a glass it will shake. It is mostly the right. No trouble writing.     PCP ordered CT head which showed mild global atrophy and white matter changes. B12 was elevated at 988. Vitamin D and TSH normal. He has hearing loss and has been wearing these for decades. He just got a new pair.     He has some trouble sleeping, he will wake up early and not be able to fall back asleep. He takes melatonin. No snoring.     Allergies:  Review of patient's allergies indicates:  No Known Allergies    Current Medications:  Current Outpatient Medications   Medication Sig Dispense Refill    aspirin (ECOTRIN) 81 MG EC tablet Take 1 tablet (81 mg total) by mouth once daily.      atorvastatin (LIPITOR) 40 MG tablet Take 1 tablet (40 mg total) by mouth every evening. 90  "tablet 3    finasteride (PROSCAR) 5 mg tablet TAKE 1 TABLET ONCE DAILY 90 tablet 3    flaxseed oil 1,000 mg Cap Take by mouth.      isosorbide mononitrate (IMDUR) 30 MG 24 hr tablet Take 1 tablet (30 mg total) by mouth once daily. 90 tablet 3    losartan-hydrochlorothiazide 50-12.5 mg (HYZAAR) 50-12.5 mg per tablet Take 1 tablet by mouth every morning. 90 tablet 3    montelukast (SINGULAIR) 10 mg tablet TAKE 1 TABLET EVERY EVENING 90 tablet 3    multivitamin with minerals tablet Take 1 tablet by mouth once daily.      omega-3 fatty acids/fish oil (FISH OIL-OMEGA-3 FATTY ACIDS) 300-1,000 mg capsule Take 1 capsule by mouth once daily.      tamsulosin (FLOMAX) 0.4 mg Cap TAKE 1 CAPSULE NIGHTLY 90 capsule 3     Current Facility-Administered Medications   Medication Dose Route Frequency Provider Last Rate Last Admin    sodium chloride 0.9% flush 10 mL  10 mL Intravenous PRN Gigi Johnson MD           Past Medical History:  Past Medical History:   Diagnosis Date    a Coronary Artery Disease     Dr. Gigi Johnson; On Medical Management Only;  6/28/22 LHC/Angiogram = (See Report)    a Mild Abdominal Aortic Ectasia     Dr. Gigi Johnson; Will Repeat An AA U/S In 2 Years; 8/24/21 AA U/S = Mild Aortic Ectasia And Mild Plaque Build-Up (See Report)    b Hypertension     c Hypercholesterolemia     His Goal LDL Is < 100 (ABD AA Plaque Build-Up)    d Hyperglycemia     10/19/22 RXd Lifestyle Changes    i Diverticulosis With H/O Diverticulitis     i H/O COVID-19 Infection     His 12/30/20 COVID-19 Swab Test = Was Positive    i Pulmonary Subcentimeter Nodules     8/26/21 6 Month Follow-Up Chest CT Scan W/O Contrast = Ordered; 8/24/21 Cardiac CT Ca+ Score = 1153.9 (See Report)    j H/O Colon Polyps On Previous Colonoscopy     His GI M.D. (After His 11/29/17 Was Free Of Polyps): "Repeat TC In 5 Years"    k Benign Prostatic Hyperplasia     k H/O Nephrolithiasis     m Short Term Memory Loss 11/10/2024    10/28/24 Referred To " "[facility]; 11/7/24 Head CT Scan W/O Contrast = Unremarkable (See Report); 11/25/21 Offered A Neurology Referral; 10/14/21 TFTs, Vitamin B12, And RPR = Normal    o Chronic Nasal Congestion     On Singulair 10 Mg qHS    o Chronic Right Maxillary Sinusitis 11/10/2024    11/10/24 Referred To Dr. Trent Astorga; 11/7/24 Head CT Scan W/O Contrast = Unremarkable (See Report)    o [place] (hard of hearing)     hearing aids    Wellness Visit 10/28/2024        Past Surgical History:  Past Surgical History:   Procedure Laterality Date    ANGIOGRAM, CORONARY, WITH LEFT HEART CATHETERIZATION Left 6/28/2022    Procedure: Angiogram, Coronary, with Left Heart Cath;  Surgeon: Dennys Kerr MD;  Location: [facility] CATH;  Service: Cardiology;  Laterality: Left;    COLONOSCOPY         Family History:  family history includes Arthritis in his mother; Heart disease in his father.    Social History:   reports that he has never smoked. He has never been exposed to tobacco smoke. He has never used smokeless tobacco. He reports current alcohol use. He reports that he does not use drugs.    Physical Exam:  Vitals:    02/05/25 0756   BP: (!) 143/66   Pulse: 60   Resp: 14   Weight: 91.2 kg (200 lb 15.2 oz)   Height: 5' 10" (1.778 m)   PainSc: 0-No pain     Body mass index is 28.83 kg/m².    Neurological Exam:  Mental status: Awake, alert. MMSE 30/30  Speech/Language: No dysarthria or aphasia on conversation.   Cranial nerves: Pupils equal round and reactive to light, extraocular movements intact, facial strength and sensation intact bilaterally, tongue midline, hearing grossly intact bilaterally. Shoulder shrug normal bilaterally.   Motor: 5 out of 5 strength throughout the upper and lower extremities bilaterally. Normal bulk and tone.   Sensation: Intact to light touch and vibration bilaterally.  DTR: 2+ at the knees and biceps bilaterally.  Coordination: Finger-nose-finger testing and rapid alternating movements normal bilaterally. No tremor. " "  Gait: Normal gait    Data:  I have personally reviewed the referring provider's notes, labs, & imaging made available to me today.     Labs:  CBC:   Lab Results   Component Value Date    WBC 7.08 11/07/2024    HGB 14.3 11/07/2024    HCT 43.3 11/07/2024     11/07/2024    MCV 91 11/07/2024    RDW 13.9 11/07/2024     BMP:   Lab Results   Component Value Date     11/07/2024    K 4.5 11/07/2024     11/07/2024    CO2 27 11/07/2024    BUN 18 11/07/2024    CREATININE 0.86 11/07/2024     (H) 11/07/2024    CALCIUM 9.7 11/07/2024     LFTS;   Lab Results   Component Value Date    PROT 6.9 11/07/2024    ALBUMIN 4.0 11/07/2024    BILITOT 0.3 11/07/2024    AST 28 11/07/2024    ALKPHOS 84 11/07/2024    ALT 22 11/07/2024     COAGS: No results found for: "INR", "PROTIME", "PTT"  FLP:   Lab Results   Component Value Date    CHOL 139 11/07/2024    HDL 47 11/07/2024    LDLCALC 77.4 11/07/2024    TRIG 73 11/07/2024    CHOLHDL 33.8 11/07/2024       Imaging:  I have personally reviewed the imaging, CT head with atrophy and white matter changes.     Assessment and Plan:  Mr. Germain is a 84 y.o. male here for short term memory loss. His MMSE is normal at 30/30 but this may be underestimating his memory loss. Per the history they describe, I think he could have mild cognitive impairment. Will plan for MRI brain and neuropsych testing. Discussed the diagnosis of mild cognitive impairment and the oral medications but will wait to see what neuropsych testing shows. Will notify them of MRI results and will f/u after neuropysch testing.     Short Term Memory Loss  -     Ambulatory referral/consult to Neurology  -     MRI Brain Without Contrast; Future; Expected date: 02/05/2025  -     Ambulatory referral/consult to Neuropsychology; Future; Expected date: 02/12/2025    Other amnesia  -     MRI Brain Without Contrast; Future; Expected date: 02/05/2025         I spent a total of 60 minutes on the day of the visit.This " includes face to face time and non-face to face time preparing to see the patient (eg, review of tests), Obtaining and/or reviewing separately obtained history, Documenting clinical information in the electronic or other health record, Independently interpreting results and communicating results to the patient/family/caregiver, or Care coordination.

## 2025-07-18 DIAGNOSIS — I10 ESSENTIAL HYPERTENSION: ICD-10-CM

## 2025-07-18 RX ORDER — ISOSORBIDE MONONITRATE 30 MG/1
30 TABLET, EXTENDED RELEASE ORAL
Qty: 90 TABLET | Refills: 3 | Status: SHIPPED | OUTPATIENT
Start: 2025-07-18